# Patient Record
Sex: MALE | Race: WHITE | Employment: OTHER | URBAN - METROPOLITAN AREA
[De-identification: names, ages, dates, MRNs, and addresses within clinical notes are randomized per-mention and may not be internally consistent; named-entity substitution may affect disease eponyms.]

---

## 2021-08-29 ENCOUNTER — HOSPITAL ENCOUNTER (EMERGENCY)
Age: 61
Discharge: LEFT BEFORE DISCHARGE | End: 2021-08-29
Attending: FAMILY MEDICINE

## 2021-09-08 ENCOUNTER — HOSPITAL ENCOUNTER (INPATIENT)
Age: 61
LOS: 1 days | Discharge: HOME | DRG: 192 | End: 2021-09-11
Attending: EMERGENCY MEDICINE | Admitting: INTERNAL MEDICINE
Payer: COMMERCIAL

## 2021-09-08 ENCOUNTER — APPOINTMENT (OUTPATIENT)
Dept: GENERAL RADIOLOGY | Age: 61
DRG: 192 | End: 2021-09-08
Payer: COMMERCIAL

## 2021-09-08 DIAGNOSIS — R07.9 CHEST PAIN, UNSPECIFIED TYPE: Primary | ICD-10-CM

## 2021-09-08 PROBLEM — I49.3 PVC (PREMATURE VENTRICULAR CONTRACTION): Status: ACTIVE | Noted: 2021-09-08

## 2021-09-08 PROBLEM — I49.3 FREQUENT PVCS: Status: ACTIVE | Noted: 2021-09-08

## 2021-09-08 LAB
ALBUMIN SERPL-MCNC: 3.9 G/DL (ref 3.5–5.2)
ALP BLD-CCNC: 82 U/L (ref 40–129)
ALT SERPL-CCNC: 18 U/L (ref 0–40)
ANION GAP SERPL CALCULATED.3IONS-SCNC: 9 MMOL/L (ref 7–16)
AST SERPL-CCNC: 17 U/L (ref 0–39)
BILIRUB SERPL-MCNC: 0.3 MG/DL (ref 0–1.2)
BUN BLDV-MCNC: 21 MG/DL (ref 6–23)
CALCIUM SERPL-MCNC: 9.2 MG/DL (ref 8.6–10.2)
CHLORIDE BLD-SCNC: 105 MMOL/L (ref 98–107)
CO2: 25 MMOL/L (ref 22–29)
CREAT SERPL-MCNC: 1 MG/DL (ref 0.7–1.2)
GFR AFRICAN AMERICAN: >60
GFR NON-AFRICAN AMERICAN: >60 ML/MIN/1.73
GLUCOSE BLD-MCNC: 108 MG/DL (ref 74–99)
HCT VFR BLD CALC: 44.5 % (ref 37–54)
HEMOGLOBIN: 15 G/DL (ref 12.5–16.5)
MAGNESIUM: 2.2 MG/DL (ref 1.6–2.6)
MCH RBC QN AUTO: 31.4 PG (ref 26–35)
MCHC RBC AUTO-ENTMCNC: 33.7 % (ref 32–34.5)
MCV RBC AUTO: 93.1 FL (ref 80–99.9)
PDW BLD-RTO: 14.6 FL (ref 11.5–15)
PLATELET # BLD: 295 E9/L (ref 130–450)
PMV BLD AUTO: 9.5 FL (ref 7–12)
POTASSIUM SERPL-SCNC: 4.8 MMOL/L (ref 3.5–5)
PRO-BNP: 170 PG/ML (ref 0–125)
RBC # BLD: 4.78 E12/L (ref 3.8–5.8)
SARS-COV-2, NAAT: NOT DETECTED
SODIUM BLD-SCNC: 139 MMOL/L (ref 132–146)
TOTAL PROTEIN: 6.9 G/DL (ref 6.4–8.3)
TROPONIN, HIGH SENSITIVITY: 7 NG/L (ref 0–11)
WBC # BLD: 13.6 E9/L (ref 4.5–11.5)

## 2021-09-08 PROCEDURE — 93005 ELECTROCARDIOGRAM TRACING: CPT | Performed by: PHYSICIAN ASSISTANT

## 2021-09-08 PROCEDURE — 99283 EMERGENCY DEPT VISIT LOW MDM: CPT

## 2021-09-08 PROCEDURE — 83880 ASSAY OF NATRIURETIC PEPTIDE: CPT

## 2021-09-08 PROCEDURE — 84484 ASSAY OF TROPONIN QUANT: CPT

## 2021-09-08 PROCEDURE — G0378 HOSPITAL OBSERVATION PER HR: HCPCS

## 2021-09-08 PROCEDURE — 80053 COMPREHEN METABOLIC PANEL: CPT

## 2021-09-08 PROCEDURE — 83735 ASSAY OF MAGNESIUM: CPT

## 2021-09-08 PROCEDURE — 71045 X-RAY EXAM CHEST 1 VIEW: CPT

## 2021-09-08 PROCEDURE — 85027 COMPLETE CBC AUTOMATED: CPT

## 2021-09-08 PROCEDURE — 93005 ELECTROCARDIOGRAM TRACING: CPT | Performed by: NURSE PRACTITIONER

## 2021-09-08 PROCEDURE — 87635 SARS-COV-2 COVID-19 AMP PRB: CPT

## 2021-09-08 NOTE — ED TRIAGE NOTES
FIRST PROVIDER CONTACT ASSESSMENT NOTE                                                                                                Department of Emergency Medicine                                                      First Provider Note  21  4:30 PM EDT  NAME: Chau Bob  : 1960  MRN: [id number]    Chief Complaint: Chest Pain (was seen at doctor for chest pain x1 week with shortness of breath. sent in for abnormal ekg)      History of Present Illness:   Chau Bob is a 63 y.o. male who presents to the ED for CHEST PAIN AND SOB    Focused Physical Exam:  VS:    ED Triage Vitals [21 1619]   BP Temp Temp src Pulse Resp SpO2 Height Weight   (!) 158/81 98.6 °F (37 °C) -- 58 17 97 % 6' (1.829 m) 193 lb (87.5 kg)        General: Alert and in no apparent distress. Medical History:  has no past medical history on file. Surgical History:  has no past surgical history on file. Social History:  reports that he has been smoking cigarettes. He has been smoking about 1.50 packs per day. He has never used smokeless tobacco. He reports current alcohol use. He reports that he does not use drugs. Family History: family history is not on file. Allergies: Patient has no known allergies.      Initial Plan of Care:  Initiate Treatment-Testing, Proceed toTreatment Area When Bed Available for ED Attending/MLP to Continue Care    -------------------------------------------------END OF FIRST PROVIDER CONTACT ASSESSMENT NOTE--------------------------------------------------------  Electronically signed by KAPIL Mckeon CNP   DD: 21

## 2021-09-09 ENCOUNTER — APPOINTMENT (OUTPATIENT)
Dept: NON INVASIVE DIAGNOSTICS | Age: 61
DRG: 192 | End: 2021-09-09
Payer: COMMERCIAL

## 2021-09-09 ENCOUNTER — APPOINTMENT (OUTPATIENT)
Dept: NUCLEAR MEDICINE | Age: 61
DRG: 192 | End: 2021-09-09
Payer: COMMERCIAL

## 2021-09-09 ENCOUNTER — APPOINTMENT (OUTPATIENT)
Dept: CT IMAGING | Age: 61
DRG: 192 | End: 2021-09-09
Payer: COMMERCIAL

## 2021-09-09 DIAGNOSIS — I49.3 FREQUENT PVCS: Primary | ICD-10-CM

## 2021-09-09 DIAGNOSIS — R42 LIGHTHEADEDNESS: ICD-10-CM

## 2021-09-09 DIAGNOSIS — R42 DIZZINESS: ICD-10-CM

## 2021-09-09 DIAGNOSIS — R06.02 SOB (SHORTNESS OF BREATH): ICD-10-CM

## 2021-09-09 LAB
ADENOVIRUS BY PCR: NOT DETECTED
ALBUMIN SERPL-MCNC: 3.8 G/DL (ref 3.5–5.2)
ALP BLD-CCNC: 75 U/L (ref 40–129)
ALT SERPL-CCNC: 15 U/L (ref 0–40)
AMPHETAMINE SCREEN, URINE: NOT DETECTED
ANION GAP SERPL CALCULATED.3IONS-SCNC: 12 MMOL/L (ref 7–16)
APTT: 23.3 SEC (ref 24.5–35.1)
AST SERPL-CCNC: 13 U/L (ref 0–39)
BARBITURATE SCREEN URINE: NOT DETECTED
BASOPHILS ABSOLUTE: 0.05 E9/L (ref 0–0.2)
BASOPHILS RELATIVE PERCENT: 0.4 % (ref 0–2)
BENZODIAZEPINE SCREEN, URINE: NOT DETECTED
BILIRUB SERPL-MCNC: 0.5 MG/DL (ref 0–1.2)
BORDETELLA PARAPERTUSSIS BY PCR: NOT DETECTED
BORDETELLA PERTUSSIS BY PCR: NOT DETECTED
BUN BLDV-MCNC: 21 MG/DL (ref 6–23)
CALCIUM SERPL-MCNC: 9 MG/DL (ref 8.6–10.2)
CANNABINOID SCREEN URINE: POSITIVE
CHLAMYDOPHILIA PNEUMONIAE BY PCR: NOT DETECTED
CHLORIDE BLD-SCNC: 104 MMOL/L (ref 98–107)
CHOLESTEROL, TOTAL: 184 MG/DL (ref 0–199)
CO2: 23 MMOL/L (ref 22–29)
COCAINE METABOLITE SCREEN URINE: NOT DETECTED
CORONAVIRUS 229E BY PCR: NOT DETECTED
CORONAVIRUS HKU1 BY PCR: NOT DETECTED
CORONAVIRUS NL63 BY PCR: NOT DETECTED
CORONAVIRUS OC43 BY PCR: NOT DETECTED
CREAT SERPL-MCNC: 0.8 MG/DL (ref 0.7–1.2)
EKG ATRIAL RATE: 104 BPM
EKG ATRIAL RATE: 110 BPM
EKG P AXIS: 62 DEGREES
EKG P AXIS: 76 DEGREES
EKG P-R INTERVAL: 128 MS
EKG P-R INTERVAL: 132 MS
EKG Q-T INTERVAL: 352 MS
EKG Q-T INTERVAL: 354 MS
EKG QRS DURATION: 80 MS
EKG QRS DURATION: 90 MS
EKG QTC CALCULATION (BAZETT): 465 MS
EKG QTC CALCULATION (BAZETT): 476 MS
EKG R AXIS: 43 DEGREES
EKG R AXIS: 61 DEGREES
EKG T AXIS: 71 DEGREES
EKG T AXIS: 76 DEGREES
EKG VENTRICULAR RATE: 104 BPM
EKG VENTRICULAR RATE: 110 BPM
EOSINOPHILS ABSOLUTE: 0.19 E9/L (ref 0.05–0.5)
EOSINOPHILS RELATIVE PERCENT: 1.6 % (ref 0–6)
FENTANYL SCREEN, URINE: NOT DETECTED
GFR AFRICAN AMERICAN: >60
GFR NON-AFRICAN AMERICAN: >60 ML/MIN/1.73
GLUCOSE BLD-MCNC: 89 MG/DL (ref 74–99)
HBA1C MFR BLD: 6 % (ref 4–5.6)
HCT VFR BLD CALC: 42.4 % (ref 37–54)
HDLC SERPL-MCNC: 63 MG/DL
HEMOGLOBIN: 14.4 G/DL (ref 12.5–16.5)
HUMAN METAPNEUMOVIRUS BY PCR: NOT DETECTED
HUMAN RHINOVIRUS/ENTEROVIRUS BY PCR: NOT DETECTED
IMMATURE GRANULOCYTES #: 0.11 E9/L
IMMATURE GRANULOCYTES %: 0.9 % (ref 0–5)
INFLUENZA A BY PCR: NOT DETECTED
INFLUENZA B BY PCR: NOT DETECTED
INR BLD: 1
LACTATE DEHYDROGENASE: 167 U/L (ref 135–225)
LDL CHOLESTEROL CALCULATED: 87 MG/DL (ref 0–99)
LV EF: 58 %
LVEF MODALITY: NORMAL
LYMPHOCYTES ABSOLUTE: 3.11 E9/L (ref 1.5–4)
LYMPHOCYTES RELATIVE PERCENT: 26 % (ref 20–42)
Lab: ABNORMAL
MCH RBC QN AUTO: 31.6 PG (ref 26–35)
MCHC RBC AUTO-ENTMCNC: 34 % (ref 32–34.5)
MCV RBC AUTO: 93 FL (ref 80–99.9)
METHADONE SCREEN, URINE: NOT DETECTED
MONOCYTES ABSOLUTE: 1.03 E9/L (ref 0.1–0.95)
MONOCYTES RELATIVE PERCENT: 8.6 % (ref 2–12)
MYCOPLASMA PNEUMONIAE BY PCR: NOT DETECTED
NEUTROPHILS ABSOLUTE: 7.45 E9/L (ref 1.8–7.3)
NEUTROPHILS RELATIVE PERCENT: 62.5 % (ref 43–80)
OPIATE SCREEN URINE: NOT DETECTED
OXYCODONE URINE: NOT DETECTED
PARAINFLUENZA VIRUS 1 BY PCR: NOT DETECTED
PARAINFLUENZA VIRUS 2 BY PCR: NOT DETECTED
PARAINFLUENZA VIRUS 3 BY PCR: NOT DETECTED
PARAINFLUENZA VIRUS 4 BY PCR: NOT DETECTED
PDW BLD-RTO: 14.8 FL (ref 11.5–15)
PHENCYCLIDINE SCREEN URINE: NOT DETECTED
PLATELET # BLD: 263 E9/L (ref 130–450)
PMV BLD AUTO: 9.7 FL (ref 7–12)
POTASSIUM REFLEX MAGNESIUM: 4.4 MMOL/L (ref 3.5–5)
PROCALCITONIN: <0.02 NG/ML (ref 0–0.08)
PROSTATE SPECIFIC ANTIGEN: 2.05 NG/ML (ref 0–4)
PROTHROMBIN TIME: 10.8 SEC (ref 9.3–12.4)
RBC # BLD: 4.56 E12/L (ref 3.8–5.8)
RESPIRATORY SYNCYTIAL VIRUS BY PCR: NOT DETECTED
SARS-COV-2, PCR: NOT DETECTED
SODIUM BLD-SCNC: 139 MMOL/L (ref 132–146)
T4 FREE: 1.23 NG/DL (ref 0.93–1.7)
TOTAL PROTEIN: 6.5 G/DL (ref 6.4–8.3)
TRIGL SERPL-MCNC: 171 MG/DL (ref 0–149)
TROPONIN, HIGH SENSITIVITY: 9 NG/L (ref 0–11)
TSH SERPL DL<=0.05 MIU/L-ACNC: 2.91 UIU/ML (ref 0.27–4.2)
VLDLC SERPL CALC-MCNC: 34 MG/DL
WBC # BLD: 11.9 E9/L (ref 4.5–11.5)

## 2021-09-09 PROCEDURE — 0202U NFCT DS 22 TRGT SARS-COV-2: CPT

## 2021-09-09 PROCEDURE — 6370000000 HC RX 637 (ALT 250 FOR IP): Performed by: PHYSICIAN ASSISTANT

## 2021-09-09 PROCEDURE — 80053 COMPREHEN METABOLIC PANEL: CPT

## 2021-09-09 PROCEDURE — 2580000003 HC RX 258: Performed by: PHYSICIAN ASSISTANT

## 2021-09-09 PROCEDURE — APPSS180 APP SPLIT SHARED TIME > 60 MINUTES: Performed by: NURSE PRACTITIONER

## 2021-09-09 PROCEDURE — G0378 HOSPITAL OBSERVATION PER HR: HCPCS

## 2021-09-09 PROCEDURE — 85025 COMPLETE CBC W/AUTO DIFF WBC: CPT

## 2021-09-09 PROCEDURE — 93018 CV STRESS TEST I&R ONLY: CPT | Performed by: INTERNAL MEDICINE

## 2021-09-09 PROCEDURE — 80307 DRUG TEST PRSMV CHEM ANLYZR: CPT

## 2021-09-09 PROCEDURE — 84145 PROCALCITONIN (PCT): CPT

## 2021-09-09 PROCEDURE — 84443 ASSAY THYROID STIM HORMONE: CPT

## 2021-09-09 PROCEDURE — 6360000004 HC RX CONTRAST MEDICATION: Performed by: RADIOLOGY

## 2021-09-09 PROCEDURE — 78452 HT MUSCLE IMAGE SPECT MULT: CPT

## 2021-09-09 PROCEDURE — 3430000000 HC RX DIAGNOSTIC RADIOPHARMACEUTICAL: Performed by: RADIOLOGY

## 2021-09-09 PROCEDURE — 85610 PROTHROMBIN TIME: CPT

## 2021-09-09 PROCEDURE — 83615 LACTATE (LD) (LDH) ENZYME: CPT

## 2021-09-09 PROCEDURE — 84153 ASSAY OF PSA TOTAL: CPT

## 2021-09-09 PROCEDURE — 84484 ASSAY OF TROPONIN QUANT: CPT

## 2021-09-09 PROCEDURE — 84439 ASSAY OF FREE THYROXINE: CPT

## 2021-09-09 PROCEDURE — 80061 LIPID PANEL: CPT

## 2021-09-09 PROCEDURE — 78452 HT MUSCLE IMAGE SPECT MULT: CPT | Performed by: INTERNAL MEDICINE

## 2021-09-09 PROCEDURE — 83036 HEMOGLOBIN GLYCOSYLATED A1C: CPT

## 2021-09-09 PROCEDURE — 36415 COLL VENOUS BLD VENIPUNCTURE: CPT

## 2021-09-09 PROCEDURE — 71260 CT THORAX DX C+: CPT

## 2021-09-09 PROCEDURE — 93016 CV STRESS TEST SUPVJ ONLY: CPT | Performed by: INTERNAL MEDICINE

## 2021-09-09 PROCEDURE — 93017 CV STRESS TEST TRACING ONLY: CPT

## 2021-09-09 PROCEDURE — A9500 TC99M SESTAMIBI: HCPCS | Performed by: RADIOLOGY

## 2021-09-09 PROCEDURE — 85730 THROMBOPLASTIN TIME PARTIAL: CPT

## 2021-09-09 PROCEDURE — 93010 ELECTROCARDIOGRAM REPORT: CPT | Performed by: INTERNAL MEDICINE

## 2021-09-09 RX ORDER — ONDANSETRON 4 MG/1
4 TABLET, ORALLY DISINTEGRATING ORAL EVERY 8 HOURS PRN
Status: DISCONTINUED | OUTPATIENT
Start: 2021-09-09 | End: 2021-09-11 | Stop reason: HOSPADM

## 2021-09-09 RX ORDER — CITALOPRAM 40 MG/1
1 TABLET ORAL DAILY
COMMUNITY

## 2021-09-09 RX ORDER — SODIUM CHLORIDE 0.9 % (FLUSH) 0.9 %
5-40 SYRINGE (ML) INJECTION PRN
Status: DISCONTINUED | OUTPATIENT
Start: 2021-09-09 | End: 2021-09-11 | Stop reason: HOSPADM

## 2021-09-09 RX ORDER — SODIUM CHLORIDE 0.9 % (FLUSH) 0.9 %
5-40 SYRINGE (ML) INJECTION EVERY 12 HOURS SCHEDULED
Status: DISCONTINUED | OUTPATIENT
Start: 2021-09-09 | End: 2021-09-11 | Stop reason: HOSPADM

## 2021-09-09 RX ORDER — CYCLOBENZAPRINE HCL 10 MG
1 TABLET ORAL PRN
Status: ON HOLD | COMMUNITY
End: 2021-09-11 | Stop reason: HOSPADM

## 2021-09-09 RX ORDER — ACETAMINOPHEN 650 MG/1
650 SUPPOSITORY RECTAL EVERY 6 HOURS PRN
Status: DISCONTINUED | OUTPATIENT
Start: 2021-09-09 | End: 2021-09-11 | Stop reason: HOSPADM

## 2021-09-09 RX ORDER — ACETAMINOPHEN 325 MG/1
650 TABLET ORAL EVERY 6 HOURS PRN
Status: DISCONTINUED | OUTPATIENT
Start: 2021-09-09 | End: 2021-09-11 | Stop reason: HOSPADM

## 2021-09-09 RX ORDER — SODIUM CHLORIDE 9 MG/ML
25 INJECTION, SOLUTION INTRAVENOUS PRN
Status: DISCONTINUED | OUTPATIENT
Start: 2021-09-09 | End: 2021-09-11 | Stop reason: HOSPADM

## 2021-09-09 RX ORDER — PREDNISONE 20 MG/1
20 TABLET ORAL DAILY
Status: ON HOLD | COMMUNITY
Start: 2021-08-30 | End: 2021-09-11 | Stop reason: SDUPTHER

## 2021-09-09 RX ORDER — MAGNESIUM SULFATE IN WATER 40 MG/ML
2000 INJECTION, SOLUTION INTRAVENOUS PRN
Status: DISCONTINUED | OUTPATIENT
Start: 2021-09-09 | End: 2021-09-11 | Stop reason: HOSPADM

## 2021-09-09 RX ORDER — NICOTINE 21 MG/24HR
1 PATCH, TRANSDERMAL 24 HOURS TRANSDERMAL DAILY
Status: DISCONTINUED | OUTPATIENT
Start: 2021-09-09 | End: 2021-09-11 | Stop reason: HOSPADM

## 2021-09-09 RX ORDER — ONDANSETRON 2 MG/ML
4 INJECTION INTRAMUSCULAR; INTRAVENOUS EVERY 6 HOURS PRN
Status: DISCONTINUED | OUTPATIENT
Start: 2021-09-09 | End: 2021-09-11 | Stop reason: HOSPADM

## 2021-09-09 RX ORDER — POLYETHYLENE GLYCOL 3350 17 G/17G
17 POWDER, FOR SOLUTION ORAL DAILY PRN
Status: DISCONTINUED | OUTPATIENT
Start: 2021-09-09 | End: 2021-09-11 | Stop reason: HOSPADM

## 2021-09-09 RX ORDER — POTASSIUM CHLORIDE 7.45 MG/ML
10 INJECTION INTRAVENOUS PRN
Status: DISCONTINUED | OUTPATIENT
Start: 2021-09-09 | End: 2021-09-11 | Stop reason: HOSPADM

## 2021-09-09 RX ORDER — FAMOTIDINE 20 MG/1
20 TABLET, FILM COATED ORAL DAILY
Status: DISCONTINUED | OUTPATIENT
Start: 2021-09-09 | End: 2021-09-11 | Stop reason: HOSPADM

## 2021-09-09 RX ADMIN — ACETAMINOPHEN 650 MG: 325 TABLET ORAL at 02:32

## 2021-09-09 RX ADMIN — IOPAMIDOL 90 ML: 755 INJECTION, SOLUTION INTRAVENOUS at 11:55

## 2021-09-09 RX ADMIN — SODIUM CHLORIDE, PRESERVATIVE FREE 10 ML: 5 INJECTION INTRAVENOUS at 10:26

## 2021-09-09 RX ADMIN — Medication 35 MILLICURIE: at 13:32

## 2021-09-09 RX ADMIN — FAMOTIDINE 20 MG: 20 TABLET, FILM COATED ORAL at 10:21

## 2021-09-09 RX ADMIN — ACETAMINOPHEN 650 MG: 325 TABLET ORAL at 20:06

## 2021-09-09 RX ADMIN — Medication 11.4 MILLICURIE: at 12:13

## 2021-09-09 RX ADMIN — SODIUM CHLORIDE, PRESERVATIVE FREE 10 ML: 5 INJECTION INTRAVENOUS at 20:06

## 2021-09-09 ASSESSMENT — PAIN DESCRIPTION - FREQUENCY: FREQUENCY: INTERMITTENT

## 2021-09-09 ASSESSMENT — PAIN SCALES - GENERAL
PAINLEVEL_OUTOF10: 0
PAINLEVEL_OUTOF10: 7
PAINLEVEL_OUTOF10: 0
PAINLEVEL_OUTOF10: 7
PAINLEVEL_OUTOF10: 7

## 2021-09-09 ASSESSMENT — PAIN DESCRIPTION - LOCATION: LOCATION: HEAD

## 2021-09-09 ASSESSMENT — PAIN - FUNCTIONAL ASSESSMENT: PAIN_FUNCTIONAL_ASSESSMENT: ACTIVITIES ARE NOT PREVENTED

## 2021-09-09 ASSESSMENT — PAIN DESCRIPTION - PAIN TYPE: TYPE: ACUTE PAIN

## 2021-09-09 ASSESSMENT — PAIN DESCRIPTION - ONSET: ONSET: ON-GOING

## 2021-09-09 ASSESSMENT — PAIN DESCRIPTION - PROGRESSION: CLINICAL_PROGRESSION: NOT CHANGED

## 2021-09-09 ASSESSMENT — PAIN DESCRIPTION - ORIENTATION: ORIENTATION: MID

## 2021-09-09 ASSESSMENT — PAIN DESCRIPTION - DESCRIPTORS: DESCRIPTORS: ACHING;HEADACHE;DISCOMFORT

## 2021-09-09 NOTE — PROGRESS NOTES
OT SESSION ATTEMPT     Date:2021  Patient Name: Gloria Aiken  MRN: [id number]  : 1960  Room: [address] [place] [address]     Attempted OT session this date:    Per RN pt is off unit for Stress Test at this time.   Will reattempt OT eval at a later time/date    Mrak Huerta, [address] Margie Patel OTR/L #[id number]

## 2021-09-09 NOTE — ED NOTES
JABIER faxed, [phone] notified. 8400 notified that all items marked completed at [facility] were NOT completed and accidentally marked as completed.       Belen Sellers RN  09/09/21 4995

## 2021-09-09 NOTE — H&P
[address] [facility]  History and Physical      CHIEF COMPLAINT:    Chief Complaint   Patient presents with    Chest Pain     was seen at doctor for chest pain x1 week with shortness of breath. sent in for abnormal ekg        Patient of True Monalisafalguni  presents with:  Frequent PVCs    History of Present Illness:   Patient states that for the last 2 or 3 weeks he has had a cough, mild shortness of breath with ambulation, bilateral sinus pressure, and a generalized headache. No sick contacts. He smokes 1.5 packs/day and drinks a sixpack of alcohol most days of the week. He also feels intermittent night sweats though has not had any unintentional weight loss. He denies chest pain, though apparently he reported to the ED at some point, per ED triage note. REVIEW OF SYSTEMS:  Pertinent negatives are above in HPI. 10 point ROS otherwise negative. History reviewed. No pertinent past medical history. History reviewed. No pertinent surgical history. Medications Prior to Admission:    Not in a hospital admission. Note that the patient's home medications were reviewed and the above list is accurate to the best of my knowledge at the time of the exam.    Allergies:    Patient has no known allergies. Social History:    reports that he has been smoking cigarettes. He has been smoking about 1.50 packs per day. He has never used smokeless tobacco. He reports current alcohol use. He reports that he does not use drugs.     Family History:   Dad prostate ca      PHYSICAL EXAM:    Vitals:  /70   Pulse 99   Temp 98.7 °F (37.1 °C)   Resp 16   Ht 6' (1.829 m)   Wt 193 lb (87.5 kg)   SpO2 94%   BMI 26.18 kg/m²       General appearance: NAD, conversant, appears quite a lot older than stated age  Eyes: Sclerae anicteric, PERRLA  HEENT: AT/NC, MMM, no sinus tenderness  Neck: FROM, supple, no thyromegaly  Lymph: No cervical / supraclavicular lymphadenopathy  Lungs: Clear to auscultation, WOB normal.  97%/RA both at rest and ambulation  CV: RRR, no MRGs, no lower extremity edema  Abdomen: Soft, non-tender; no masses or HSM, +BS  Extremities: FROM without synovitis. No clubbing or cyanosis of the hands. Skin: no rash, induration, lesions, or ulcers  Psych: Calm and cooperative. Normal judgement and insight. Normal mood and affect. Neuro: Alert and interactive, face symmetric, speech fluent. LABS:  All labs reviewed. Of note:  CBC with Differential:    Lab Results   Component Value Date    WBC 13.6 09/08/2021    RBC 4.78 09/08/2021    HGB 15.0 09/08/2021    HCT 44.5 09/08/2021     09/08/2021    MCV 93.1 09/08/2021    MCH 31.4 09/08/2021    MCHC 33.7 09/08/2021    RDW 14.6 09/08/2021     CMP:    Lab Results   Component Value Date     09/08/2021    K 4.8 09/08/2021     09/08/2021    CO2 25 09/08/2021    BUN 21 09/08/2021    CREATININE 1.0 09/08/2021    GFRAA >60 09/08/2021    LABGLOM >60 09/08/2021    GLUCOSE 108 09/08/2021    PROT 6.9 09/08/2021    LABALBU 3.9 09/08/2021    CALCIUM 9.2 09/08/2021    BILITOT 0.3 09/08/2021    ALKPHOS 82 09/08/2021    AST 17 09/08/2021    ALT 18 09/08/2021       Imaging:  I've personally reviewed the patient's CXR: clear    EKG:  I've personally reviewed the patient's EKG:  NSR frequent PVCs no acute ischemic changes    Telemetry:  I've personally reviewed the patient's telemetry:  NSR, PVCs     ASSESSMENT/PLAN:  Principal Problem:    Frequent PVCs  Active Problems:    PVC (premature ventricular contraction)  Resolved Problems:    * No resolved hospital problems. *      Frequent PVCs really not an indication for admission    Cards already consulted    I will order echo but due to understaffing it likely will not get done    Labs fine     Sounds like he has a viral illness.     Cough + night sweats + long-time smoker = obtain CT chest for lung cancer    Check viral panel    Code status: Full  Requires obs level of care  Ahsan OCHOA Gadiel Camacho MD    6:47 AM  9/9/2021

## 2021-09-09 NOTE — CONSULTS
Inpatient Cardiology Consultation      Reason for Consult:  PVC's. Pts PCP wanted patient admitted for cardio work up for Telnexus    Consulting Physician: Dr José Crenshaw    Requesting Physician:  Lenore LUNDBERG    Date of Consultation: 9/9/2021    HISTORY OF PRESENT ILLNESS: 62 yo not previously known to any cardiologist.     PMH: Tobacco abuse, anxiety/depression, ETOH abuse. For around 3 weeks has been feeling fatigued, experiencing ELAM, night sweats, some lightheadedness along with feeling like his head is congested with HA's  but denies CP or palpitations. [facility] 9/8/2021 BP upon arrival 158/81 HR 50's and SB. Sent in from PCP's office for abnormal EKG (PVC's) and 1 week SOB. Na139, K+ 4.8--> 4.4, Bun/Cr 21/1.0-->21/0.8, p-, troponin 7-->9, LFT's WNL, WBC 13.6-->11.9, Hgb 15.0, INR 1.0, COVID-19 NEGATIVE. CXR no CHF or pneumonia. No medications given in ED  Current /70 HR 90's. CT Chest W pending    Please note: past medical records were reviewed per electronic medical record (EMR) - see detailed reports under Past Medical/ Surgical History. Past Medical History:    1. Tobacco abuse  2. ETOH abuse 6 pack a day  3. Anxiety/Depression  4. Received J&J Vaccine    Past Surgical History:   Tonsillectomy    Medications Prior to admit:  Celexa    Current Medications:    Current Facility-Administered Medications: sodium chloride flush 0.9 % injection 5-40 mL, 5-40 mL, IntraVENous, 2 times per day  sodium chloride flush 0.9 % injection 5-40 mL, 5-40 mL, IntraVENous, PRN  0.9 % sodium chloride infusion, 25 mL, IntraVENous, PRN  enoxaparin (LOVENOX) injection 40 mg, 40 mg, SubCUTAneous, Daily  ondansetron (ZOFRAN-ODT) disintegrating tablet 4 mg, 4 mg, Oral, Q8H PRN **OR** ondansetron (ZOFRAN) injection 4 mg, 4 mg, IntraVENous, Q6H PRN  polyethylene glycol (GLYCOLAX) packet 17 g, 17 g, Oral, Daily PRN  acetaminophen (TYLENOL) tablet 650 mg, 650 mg, Oral, Q6H PRN **OR** acetaminophen (TYLENOL) suppository 650 mg, 650 mg, Rectal, Q6H PRN  potassium chloride 10 mEq/100 mL IVPB (Peripheral Line), 10 mEq, IntraVENous, PRN  magnesium sulfate 2000 mg in 50 mL IVPB premix, 2,000 mg, IntraVENous, PRN  famotidine (PEPCID) tablet 20 mg, 20 mg, Oral, Daily  nicotine (NICODERM CQ) 14 MG/24HR 1 patch, 1 patch, TransDERmal, Daily  perflutren lipid microspheres (DEFINITY) injection 1.65 mg, 1.5 mL, IntraVENous, ONCE PRN    Allergies:  NKDA per patient report    Social History:    Tobacco: 60 pack years  ETOH: 6 pack a day  Illicit Drugs: Denies  Caffeine: Coffee  Activity: Lives in split level home with wife and two step children ages 25 and 25. Drives, self-employed . No assistive devices  Code Status: Full Code      Family History: Mother  from carcinoma. No CAD/DM/PPM/ICD reported  Father  from \"MI\" his first. + tobacco and DM. No CAD/PCI/CABG/PPM/CVA/ICD. REVIEW OF SYSTEMS:     · Constitutional: + fatigue. Denies fevers, chills. + night sweats  · Eyes: Denies visual changes or drainage  · ENT: Denies headaches or hearing loss. No mouth sores or sore throat. No epistaxis   · Cardiovascular: Denies chest pain, pressure or palpitations. No lower extremity swelling. · Respiratory: + ELAM. Denies cough, orthopnea or PND. No hemoptysis   · Gastrointestinal: Denies hematemesis or anorexia. No hematochezia or melena    · Genitourinary: Denies urgency, dysuria or hematuria. · Musculoskeletal: Denies gait disturbance, weakness or joint complaints  · Integumentary: Denies rash, hives or pruritis   · Neurological: + HA's and head feels \"congested. \" + lightheaded. Denies seizures. No numbness or tingling  · Psychiatric: + anxiety/depression. · Endocrine: Denies temperature intolerance. No recent weight change. .  · Hematologic/Lymphatic: Denies abnormal bruising or bleeding.  No swollen lymph nodes    PHYSICAL EXAM:   /70   Pulse 99   Temp 98.7 °F (37.1 °C)   Resp 16 Ht 6' (1.829 m)   Wt 193 lb (87.5 kg)   SpO2 94%   BMI 26.18 kg/m²   CONST:  Well developed, thin  male who appears of stated age. Awake, alert and cooperative. No apparent distress. HEENT:   Head- Normocephalic, atraumatic   Eyes- Conjunctivae pink, anicteric  Throat- Oral mucosa pink and moist  Neck-  No stridor, trachea midline, no jugular venous distention. No carotid bruit. CHEST: Chest symmetrical and non-tender to palpation. No accessory muscle use or intercostal retractions  RESPIRATORY: Lung sounds - diminished in the bases, on RA   CARDIOVASCULAR:     Heart Ausculation- Regular rate and rhythm, no murmur. No s3, s4 or rub   PV: No lower extremity edema. No varicosities. Pedal pulses palpable, no clubbing or cyanosis   ABDOMEN: Soft, non-tender to light palpation. Bowel sounds present. No palpable masses; no abdominal bruit  MS: Good muscle strength and tone. No atrophy or abnormal movements. : Deferred  SKIN: Warm and dry no statis dermatitis or ulcers   NEURO / PSYCH: Oriented to person, place and time. Speech clear and appropriate. Follows all commands.  Pleasant affect     DATA:    ECG 9/8/2021 @ 1615 ST with frequent PVC's   Tele strips: SR with frequent PVC's/bigemeny 90's    Diagnostic:    See HPI    Labs:   CBC:   Recent Labs     09/08/21 1636 09/09/21  0630   WBC 13.6* 11.9*   HGB 15.0 14.4   HCT 44.5 42.4    263     BMP:   Recent Labs     09/08/21  1636 09/09/21  0630    139   K 4.8 4.4   CO2 25 23   BUN 21 21   CREATININE 1.0 0.8   LABGLOM >60 >60   CALCIUM 9.2 9.0     Mag:   Recent Labs     09/08/21  1636   MG 2.2     proBNP:   Recent Labs     09/08/21  1636   PROBNP 170*     PT/INR:   Recent Labs     09/09/21  0630   PROTIME 10.8   INR 1.0     APTT:  Recent Labs     09/09/21  0630   APTT 23.3*     CARDIAC ENZYMES:  Recent Labs     09/08/21  1636 09/09/21  0630   TROPHS 7 9     LIVER PROFILE:  Recent Labs     09/08/21  1636 09/09/21  0630   AST 17 13   ALT 18 15   LABALBU 3.9 3.8     ASSESSMENT:  1. Frequent PVC's  2. + ELAM  3. HA's and lightheadedness  4. + Night sweats  5. Tobacco abuse  6. ETOH abuse        PLAN:  1. Exercise MPS today  2. TTE will read when done  3. 30 day event monitor  to be placed on patient 9/10/2021  4. Smoking/ETOH cessation discusssed  5. Further recommendations to follow  6. Will need follow- up with Dr Aung Alex after discharge    Discussed with Dr Aung Alex  Electronically signed by Kiera Chang. NICHOLAS Ruiz on 9/9/2021 at 7:45 AM     Patient seen and examined case is discussed in detail with cardiology nurse practitioner and agree with assessment and plan and physical examination discussed with her in detail and documented above. Patient stress test is abnormal and he will need invasive coronary angiogram for further evaluation. N.p.o. after midnight. Myocardial perfusion stress test September 9, 2021:  Impression   1.  No stress-induced EKG changes at peak exercise. However, frequent   ventricular ectopy in the form of multiple ventricular   triplets/nonsustained VT noted. 2.  The myocardial perfusion imaging was abnormal.   3.  The abnormality was a medium-sized, mild perfusion defect   involving the mid to distal inferior septum and anterior septum   suggestive of prior infarct with minimal homa-infarct ischemia. 4.  Overall left ventricular systolic function was normal with   regional wall motion abnormalities. 5.  No transient ischemic dilatation. 6.  [place] treadmill score was 3.5 implying intermediate risk.     7.  Exercise capacity was below average.    8.  Intermediate risk general exercise treadmill test.       Thank you for sending your patient to this [facility].

## 2021-09-09 NOTE — ED NOTES
Bed: HB  Expected date:   Expected time:   Means of arrival:   Comments:  triage     Indra Palmer RN  09/08/21 2036

## 2021-09-09 NOTE — ED PROVIDER NOTES
Department of Emergency Medicine   ED  Provider Note  Admit Date/RoomTime: 9/8/2021  8:36 PM  ED Room: [facility]          History of Present Illness:  9/8/21, Time: 8:56 PM EDT  Chief Complaint   Patient presents with    Chest Pain     was seen at doctor for chest pain x1 week with shortness of breath. sent in for abnormal ekg                Vonda Bear is a 63 y.o. male presenting to the ED for shortness of breath. Patient has runny nose, cough, congestion for the past week. Also is admitting chest tightness for the past week. Came on gradually, nothing makes it better or worse, tightness does not radiate anywhere. Did get his Covid vaccine. Was seen by his PCPs office today, had EKG which showed bigeminy, he was sent in for evaluation. Has no known cardiac disease. Denies any nausea, vomiting, back pain, change in bowel or bladder, paresthesias,  back pain, or any other symptoms or complaints. Review of Systems:   Pertinent positives and negatives are stated within HPI, all other systems reviewed and are negative.        --------------------------------------------- PAST HISTORY ---------------------------------------------  Past Medical History:  has no past medical history on file. Past Surgical History:  has no past surgical history on file. Social History:  reports that he has been smoking cigarettes. He has been smoking about 1.50 packs per day. He has never used smokeless tobacco. He reports current alcohol use. He reports that he does not use drugs. Family History: family history is not on file. . Unless otherwise noted, family history is non contributory    The patients home medications have been reviewed. Allergies: Patient has no known allergies.         ---------------------------------------------------PHYSICAL EXAM--------------------------------------    Constitutional/General: Alert and oriented x3  Head: Normocephalic and atraumatic  Eyes: PERRL, EOMI, sclera non icteric  Mouth: Oropharynx clear, handling secretions, no trismus, no asymmetry of the posterior oropharynx or uvular edema  Neck: Supple, full ROM, no stridor, no meningeal signs  Respiratory: Lungs clear to auscultation bilaterally, no wheezes, rales, or rhonchi. Not in respiratory distress  Cardiovascular:  Regular rate. Regular rhythm. 2+ distal pulses. Equal extremity pulses. Chest: No chest wall tenderness  GI:  Abdomen Soft, Non tender, Non distended. No rebound, guarding, or rigidity. No pulsatile masses. Musculoskeletal: Moves all extremities x 4. Warm and well perfused, no clubbing, cyanosis, or edema. Capillary refill <3 seconds  Integument: skin warm and dry. No rashes. Neurologic: GCS 15, no focal deficits, symmetric strength 5/5 in the upper and lower extremities bilaterally  Psychiatric: Normal Affect          -------------------------------------------------- RESULTS -------------------------------------------------  I have personally reviewed all laboratory and imaging results for this patient. Results are listed below.      LABS: (Lab results interpreted by me)  Results for orders placed or performed during the hospital encounter of 09/08/21   COVID-19, Rapid    Specimen: Nasopharyngeal Swab   Result Value Ref Range    SARS-CoV-2, NAAT Not Detected Not Detected   CBC   Result Value Ref Range    WBC 13.6 (H) 4.5 - 11.5 E9/L    RBC 4.78 3.80 - 5.80 E12/L    Hemoglobin 15.0 12.5 - 16.5 g/dL    Hematocrit 44.5 37.0 - 54.0 %    MCV 93.1 80.0 - 99.9 fL    MCH 31.4 26.0 - 35.0 pg    MCHC 33.7 32.0 - 34.5 %    RDW 14.6 11.5 - 15.0 fL    Platelets 186 559 - 414 E9/L    MPV 9.5 7.0 - 12.0 fL   Comprehensive Metabolic Panel   Result Value Ref Range    Sodium 139 132 - 146 mmol/L    Potassium 4.8 3.5 - 5.0 mmol/L    Chloride 105 98 - 107 mmol/L    CO2 25 22 - 29 mmol/L    Anion Gap 9 7 - 16 mmol/L    Glucose 108 (H) 74 - 99 mg/dL    BUN 21 6 - 23 mg/dL    CREATININE 1.0 0.7 - 1.2 mg/dL    GFR Non- American >60 >=60 mL/min/1.73    GFR African American >60     Calcium 9.2 8.6 - 10.2 mg/dL    Total Protein 6.9 6.4 - 8.3 g/dL    Albumin 3.9 3.5 - 5.2 g/dL    Total Bilirubin 0.3 0.0 - 1.2 mg/dL    Alkaline Phosphatase 82 40 - 129 U/L    ALT 18 0 - 40 U/L    AST 17 0 - 39 U/L   Troponin   Result Value Ref Range    Troponin, High Sensitivity 7 0 - 11 ng/L   Brain Natriuretic Peptide   Result Value Ref Range    Pro- (H) 0 - 125 pg/mL   Magnesium   Result Value Ref Range    Magnesium 2.2 1.6 - 2.6 mg/dL   ,       RADIOLOGY:  Interpreted by Radiologist unless otherwise specified  XR CHEST PORTABLE    (Results Pending)         EKG Interpretation  Interpreted by emergency department physician, Dr. Teresa Winston     Sinus rhythm, rate 110, scattered PVCs, no STEMI        ------------------------- NURSING NOTES AND VITALS REVIEWED ---------------------------   The nursing notes within the ED encounter and vital signs as below have been reviewed by myself  BP (!) 158/81   Pulse 58   Temp 98.6 °F (37 °C)   Resp 17   Ht 6' (1.829 m)   Wt 193 lb (87.5 kg)   SpO2 97%   BMI 26.18 kg/m²     Oxygen Saturation Interpretation: Normal    The patients available past medical records and past encounters were reviewed. ------------------------------ ED COURSE/MEDICAL DECISION MAKING----------------------  Medications - No data to display        The cardiac monitor revealed sinus with a heart rate in the 90s as interpreted by me. The cardiac monitor was ordered secondary to the patient's CP and to monitor the patient for dysrhythmia. [facility] [id number]         Medical Decision Making:    Patient's outpatient EKGs did show bigeminy. He has PVCs on his rhythm strip. .  Labs and imaging reviewed. Reevaluation, patient's resting comfortably. States his PCP want admitted him admited for cardiac evaluation. Discussed with the hospitalist, patient will be admitted. Counseling:    The emergency provider has spoken with the patient and discussed todays results, in addition to providing specific details for the plan of care and counseling regarding the diagnosis and prognosis. Questions are answered at this time and they are agreeable with the plan.       --------------------------------- IMPRESSION AND DISPOSITION ---------------------------------    IMPRESSION  1. Chest pain, unspecified type        DISPOSITION  Disposition: Admit to telemetry  Patient condition is stable        NOTE: This report was transcribed using voice recognition software.  Every effort was made to ensure accuracy; however, inadvertent computerized transcription errors may be present        Marysol Choudhury MD  09/08/21 1188

## 2021-09-09 NOTE — PLAN OF CARE
Problem: Falls - Risk of:  Goal: Absence of physical injury  Description: Absence of physical injury  9/9/2021 1850 by Anabelle Dahl RN  Outcome: Met This Shift     Problem: Falls - Risk of:  Goal: Will remain free from falls  Description: Will remain free from falls  9/9/2021 1850 by Anabelle Dahl RN  Outcome: Met This Shift  9/9/2021 1849 by Anabelle Dahl RN  Outcome: Met This Shift

## 2021-09-09 NOTE — PROCEDURES
Exercise Nuclear Stress Test:    Cardiologist: Dr. Fiona Daley EKG: Normal sinus rhythm with frequent and consecutive premature ventricular complexes in the form of couplets. Indications for study:      Exercise stress test was performed using the Fernando Protocol   No chest pain   Exercise time: 3:31 min, METs: 5.7, MPHR: 86%, [place] treadmill score: 3.5   Ventricular ectopy progressed from frequent couplets at baseline to triplets during exercise and more ventricular complexes than sinus beats noted.  No EKG changes suggestive of stress induced ischemia   Below average functional capacity   There was an appropriate BP and heart response to exercise and recovery   Nuclear images pending    Randal Tena MD., [facility] [place].    [facility]) Cardiology

## 2021-09-10 PROBLEM — I50.22 CHRONIC SYSTOLIC (CONGESTIVE) HEART FAILURE (HCC): Status: ACTIVE | Noted: 2021-09-10

## 2021-09-10 PROBLEM — I20.0 UNSTABLE ANGINA (HCC): Status: ACTIVE | Noted: 2021-09-10

## 2021-09-10 LAB
ABO/RH: NORMAL
ALBUMIN SERPL-MCNC: 3.8 G/DL (ref 3.5–5.2)
ALP BLD-CCNC: 81 U/L (ref 40–129)
ALT SERPL-CCNC: 14 U/L (ref 0–40)
ANION GAP SERPL CALCULATED.3IONS-SCNC: 11 MMOL/L (ref 7–16)
ANTIBODY SCREEN: NORMAL
AST SERPL-CCNC: 13 U/L (ref 0–39)
BASOPHILS ABSOLUTE: 0.04 E9/L (ref 0–0.2)
BASOPHILS RELATIVE PERCENT: 0.4 % (ref 0–2)
BILIRUB SERPL-MCNC: 0.5 MG/DL (ref 0–1.2)
BUN BLDV-MCNC: 17 MG/DL (ref 6–23)
CALCIUM SERPL-MCNC: 9.5 MG/DL (ref 8.6–10.2)
CHLORIDE BLD-SCNC: 102 MMOL/L (ref 98–107)
CO2: 26 MMOL/L (ref 22–29)
CREAT SERPL-MCNC: 0.9 MG/DL (ref 0.7–1.2)
EOSINOPHILS ABSOLUTE: 0.17 E9/L (ref 0.05–0.5)
EOSINOPHILS RELATIVE PERCENT: 1.6 % (ref 0–6)
GFR AFRICAN AMERICAN: >60
GFR NON-AFRICAN AMERICAN: >60 ML/MIN/1.73
GLUCOSE BLD-MCNC: 101 MG/DL (ref 74–99)
HCT VFR BLD CALC: 46.5 % (ref 37–54)
HEMOGLOBIN: 15.4 G/DL (ref 12.5–16.5)
IMMATURE GRANULOCYTES #: 0.06 E9/L
IMMATURE GRANULOCYTES %: 0.6 % (ref 0–5)
LV EF: 50 %
LVEF MODALITY: NORMAL
LYMPHOCYTES ABSOLUTE: 2.76 E9/L (ref 1.5–4)
LYMPHOCYTES RELATIVE PERCENT: 25.8 % (ref 20–42)
MCH RBC QN AUTO: 31.1 PG (ref 26–35)
MCHC RBC AUTO-ENTMCNC: 33.1 % (ref 32–34.5)
MCV RBC AUTO: 93.9 FL (ref 80–99.9)
MONOCYTES ABSOLUTE: 1.03 E9/L (ref 0.1–0.95)
MONOCYTES RELATIVE PERCENT: 9.6 % (ref 2–12)
NEUTROPHILS ABSOLUTE: 6.63 E9/L (ref 1.8–7.3)
NEUTROPHILS RELATIVE PERCENT: 62 % (ref 43–80)
PDW BLD-RTO: 14.6 FL (ref 11.5–15)
PLATELET # BLD: 259 E9/L (ref 130–450)
PMV BLD AUTO: 9.8 FL (ref 7–12)
POTASSIUM REFLEX MAGNESIUM: 5 MMOL/L (ref 3.5–5)
RBC # BLD: 4.95 E12/L (ref 3.8–5.8)
SODIUM BLD-SCNC: 139 MMOL/L (ref 132–146)
TOTAL PROTEIN: 6.7 G/DL (ref 6.4–8.3)
WBC # BLD: 10.7 E9/L (ref 4.5–11.5)

## 2021-09-10 PROCEDURE — 6370000000 HC RX 637 (ALT 250 FOR IP): Performed by: PHYSICIAN ASSISTANT

## 2021-09-10 PROCEDURE — 86900 BLOOD TYPING SEROLOGIC ABO: CPT

## 2021-09-10 PROCEDURE — 36415 COLL VENOUS BLD VENIPUNCTURE: CPT

## 2021-09-10 PROCEDURE — 93306 TTE W/DOPPLER COMPLETE: CPT

## 2021-09-10 PROCEDURE — 2580000003 HC RX 258: Performed by: PHYSICIAN ASSISTANT

## 2021-09-10 PROCEDURE — APPSS30 APP SPLIT SHARED TIME 16-30 MINUTES: Performed by: NURSE PRACTITIONER

## 2021-09-10 PROCEDURE — 4A023N7 MEASUREMENT OF CARDIAC SAMPLING AND PRESSURE, LEFT HEART, PERCUTANEOUS APPROACH: ICD-10-PCS | Performed by: INTERNAL MEDICINE

## 2021-09-10 PROCEDURE — 93458 L HRT ARTERY/VENTRICLE ANGIO: CPT

## 2021-09-10 PROCEDURE — 86901 BLOOD TYPING SEROLOGIC RH(D): CPT

## 2021-09-10 PROCEDURE — 2709999900 HC NON-CHARGEABLE SUPPLY

## 2021-09-10 PROCEDURE — C1887 CATHETER, GUIDING: HCPCS

## 2021-09-10 PROCEDURE — 80053 COMPREHEN METABOLIC PANEL: CPT

## 2021-09-10 PROCEDURE — 6370000000 HC RX 637 (ALT 250 FOR IP): Performed by: INTERNAL MEDICINE

## 2021-09-10 PROCEDURE — C1894 INTRO/SHEATH, NON-LASER: HCPCS

## 2021-09-10 PROCEDURE — 6360000002 HC RX W HCPCS

## 2021-09-10 PROCEDURE — 2140000000 HC CCU INTERMEDIATE R&B

## 2021-09-10 PROCEDURE — B2111ZZ FLUOROSCOPY OF MULTIPLE CORONARY ARTERIES USING LOW OSMOLAR CONTRAST: ICD-10-PCS | Performed by: INTERNAL MEDICINE

## 2021-09-10 PROCEDURE — 99233 SBSQ HOSP IP/OBS HIGH 50: CPT | Performed by: INTERNAL MEDICINE

## 2021-09-10 PROCEDURE — 86850 RBC ANTIBODY SCREEN: CPT

## 2021-09-10 PROCEDURE — 2500000003 HC RX 250 WO HCPCS

## 2021-09-10 PROCEDURE — C1769 GUIDE WIRE: HCPCS

## 2021-09-10 PROCEDURE — 97165 OT EVAL LOW COMPLEX 30 MIN: CPT

## 2021-09-10 PROCEDURE — 93458 L HRT ARTERY/VENTRICLE ANGIO: CPT | Performed by: INTERNAL MEDICINE

## 2021-09-10 PROCEDURE — 85025 COMPLETE CBC W/AUTO DIFF WBC: CPT

## 2021-09-10 RX ORDER — SODIUM CHLORIDE 0.9 % (FLUSH) 0.9 %
5-40 SYRINGE (ML) INJECTION PRN
Status: DISCONTINUED | OUTPATIENT
Start: 2021-09-10 | End: 2021-09-11 | Stop reason: HOSPADM

## 2021-09-10 RX ORDER — METOPROLOL SUCCINATE 25 MG/1
25 TABLET, EXTENDED RELEASE ORAL DAILY
Status: DISCONTINUED | OUTPATIENT
Start: 2021-09-10 | End: 2021-09-11 | Stop reason: HOSPADM

## 2021-09-10 RX ORDER — SODIUM CHLORIDE 0.9 % (FLUSH) 0.9 %
5-40 SYRINGE (ML) INJECTION EVERY 12 HOURS SCHEDULED
Status: DISCONTINUED | OUTPATIENT
Start: 2021-09-10 | End: 2021-09-11 | Stop reason: HOSPADM

## 2021-09-10 RX ORDER — SODIUM CHLORIDE 9 MG/ML
25 INJECTION, SOLUTION INTRAVENOUS PRN
Status: DISCONTINUED | OUTPATIENT
Start: 2021-09-10 | End: 2021-09-11 | Stop reason: HOSPADM

## 2021-09-10 RX ORDER — ASPIRIN 81 MG/1
324 TABLET, CHEWABLE ORAL ONCE
Status: COMPLETED | OUTPATIENT
Start: 2021-09-10 | End: 2021-09-10

## 2021-09-10 RX ORDER — ASPIRIN 81 MG/1
81 TABLET, CHEWABLE ORAL DAILY
Status: DISCONTINUED | OUTPATIENT
Start: 2021-09-11 | End: 2021-09-11 | Stop reason: HOSPADM

## 2021-09-10 RX ORDER — ACETAMINOPHEN 325 MG/1
650 TABLET ORAL EVERY 4 HOURS PRN
Status: DISCONTINUED | OUTPATIENT
Start: 2021-09-10 | End: 2021-09-11 | Stop reason: HOSPADM

## 2021-09-10 RX ADMIN — FAMOTIDINE 20 MG: 20 TABLET, FILM COATED ORAL at 10:18

## 2021-09-10 RX ADMIN — ACETAMINOPHEN 650 MG: 325 TABLET ORAL at 20:20

## 2021-09-10 RX ADMIN — SODIUM CHLORIDE, PRESERVATIVE FREE 10 ML: 5 INJECTION INTRAVENOUS at 20:20

## 2021-09-10 RX ADMIN — METOPROLOL SUCCINATE 25 MG: 25 TABLET, EXTENDED RELEASE ORAL at 16:59

## 2021-09-10 RX ADMIN — ACETAMINOPHEN 650 MG: 325 TABLET ORAL at 10:23

## 2021-09-10 RX ADMIN — ASPIRIN 324 MG: 81 TABLET, CHEWABLE ORAL at 14:40

## 2021-09-10 RX ADMIN — SODIUM CHLORIDE, PRESERVATIVE FREE 10 ML: 5 INJECTION INTRAVENOUS at 10:20

## 2021-09-10 ASSESSMENT — PAIN SCALES - GENERAL
PAINLEVEL_OUTOF10: 0
PAINLEVEL_OUTOF10: 0
PAINLEVEL_OUTOF10: 2
PAINLEVEL_OUTOF10: 0
PAINLEVEL_OUTOF10: 0
PAINLEVEL_OUTOF10: 2
PAINLEVEL_OUTOF10: 0
PAINLEVEL_OUTOF10: 0

## 2021-09-10 NOTE — PROGRESS NOTES
[address] [place] [address] [address]        Date:9/10/2021                                                  Patient Name: Armani Muñoz    MRN: [id number]    : 1960    Room: [address] [place] [address]      Evaluating OT: Laura Shruthi, [address] OTR/L; [id number]      Referring Provider: TOÑO Peoples    Specific Provider Orders/Date: OT Eval and Treat 21      Diagnosis: PVC   Frequent PVCs    Chest Pain    Surgery: none this admission    Pertinent Medical History:  has no past medical history on file.      Recommended Adaptive Equipment: shower chair     Precautions:  Fall Risk,      Assessment of current deficits    [x] Functional mobility  [x]ADLs  [x] Strength               []Cognition    [x] Functional transfers   [x] IADLs         [x] Safety Awareness   [x]Endurance    [] Fine Coordination              [x] Balance      [] Vision/perception   []Sensation     []Gross Motor Coordination  [] ROM  [] Delirium                   [] Motor Control     OT PLAN OF CARE   OT POC based on physician orders, patient diagnosis and results of clinical assessment    Frequency/Duration: 1-3 days/wk for 2 weeks PRN   Specific OT Treatment Interventions to include:   * Instruction/training on adapted ADL techniques and AE recommendations to increase functional independence within precautions       * Training on energy conservation strategies, correct breathing pattern and techniques to improve independence/tolerance for self-care routine  * Functional transfer/mobility training/DME recommendations for increased independence, safety, and fall prevention  * Patient/Family education to increase follow through with safety techniques and functional independence  * Recommendation of environmental modifications for increased safety with functional transfers/mobility and ADLs  * Therapeutic exercise to improve motor endurance, ROM, and functional strength for ADLs/functional transfers  * Therapeutic activities to facilitate/challenge dynamic balance, stand tolerance for increased safety and independence with ADLs    Home Living: Pt lives with wife in split level home with 2+3 stairs to enter with BHR. Bedroom and 1/2 bath on lower floor with 6 steps and 1 HR to access. Full bathroom on upper level 6+6 steps with 1HR to access. Bathroom setup: tub/shower unit   Equipment owned: none    Prior Level of Function: independent with ADLs , independnet with IADLs - assist from wife with cooking/cleaning/laundry; ambulated with no AD prior  Driving: yes  Occupation:     Pain Level: 0/10  Cognition: A&O: 4/4; Follows multi step directions   Memory:  good   Sequencing:  Fair+   Problem solving:  Fair+   Judgement/safety:  Fair+     Functional Assessment:  AM-PAC Daily Activity Raw Score: 22/24   Initial Eval Status  Date: 9/10/21 Treatment Status  Date: STGs = LTGs  Time frame: 10-14 days   Feeding Independent      Grooming Independent     UB Dressing Independent       LB Dressing Stand by Assist   Ariel/doff socks seated EOB  Independent    Bathing Stand by Assist  Educated on use of shower chair for energy conservation to reduce fatigue/SOB with prolonged tasks  Independent    Toileting Independent       Bed Mobility  Log Roll: NT  Supine to sit:  Independent   Sit to supine: Independent      Functional Transfers Sit to stand: supervision   Stand to sit: supervision  Stand pivot: supervision  Commode: supervision  Sit to stand: ind   Stand to sit: ind  Stand pivot: ind  Commode: ind    Functional Mobility Supervision no AD   within room/bathroom    Independent with no AD   Balance Sitting:     Static - IND     Dynamic - IND  Standing: Supervision  Standing: Independent   Activity Tolerance Fair  Limited d/t fatigue/decreased endurance  Good   Visual/  Perceptual Glasses: no          Vitals SpO2 at rest: 95%  SpO2 following activity: 94%       Hand Dominance: Right   AROM (PROM) Strength Additional Info:  Goal:   RUE  WFL 4+/5 good  and wfl FMC/dexterity noted during ADL tasks   Improve overall RUE strength to 5/5 for participation in functional tasks       LUE WFL 4+/5 Good  and wfl FMC/dexterity noted during ADL tasks   Improve overall LUE strength to 5/5 for participation in functional tasks       Hearing: [facility]   Sensation:  No c/o numbness or tingling   Tone: WFL   Edema: unremarkable    Comment: Cleared by RN to see pt. Upon arrival patient lying supine in bed with wife present and agreeable to OT session. At end of session, patient seated EOB with wife present with call light and phone within reach, all lines and tubes intact. Overall patient demonstrated decreased independence and safety during completion of ADL/functional transfer/mobility tasks. Pt would benefit from continued skilled OT to increase safety and independence with completion of ADL/IADL tasks for functional independence and quality of life. Treatment: OT treatment provided this date includes:    ADL-  Instruction/training on safety and adapted techniques for completion of ADLs - tracee/doff socks seated EOB, toileting task and hand hygiene standing sink side, pt educated on use of shower chair to assist with decreased endurance and increased SOB    Mobility-  Instruction/training on safety and improved independence with bed mobility/functional transfers and functional mobility - educated on safety with mobility no noted LOB or decreased functional mobility this date- pt feels at baseline with exception of increased SOB with prolonged tasks and ambulation including stairs    Sitting EOB x 5 minutes to improve dynamic sitting balance and activity tolerance during ADLs.     Activity tolerance- Instruction/training on energy conservation/work simplification for completion of ADLs - limited d/t SOB        Rehab Potential: Good  for established goals     LTG: maximize independence with ADLs to return to PLOF    Patient and/or family were instructed on functional diagnosis, prognosis/goals and OT plan of care. Demonstrated fair understanding. [] Malnutrition indicators have been identified and nursing has been notified to ensure a dietitian consult is ordered. ·  Eval Complexity: Low     Evaluation time includes thorough review of current medical information, gathering information on past medical & social history & PLOF, completion of standardized testing, informal observation of tasks, consultation with other medical professions/disciplines, assessment of data & development of POC/goals. Time In: 1128  Time Out: 1138  Total Treatment Time: none    Min Units   OT Eval Low 85370  x     OT Eval Medium 24994      OT Eval High 98598      OT Re-Eval [id number]       Therapeutic Ex [id number]       Therapeutic Activities 57342       ADL/Self Care 96396       Orthotic Management 91679       Manual 90335     Neuro Re-Ed 14456       Non-Billable Time          Evaluation Time additionally includes thorough review of current medical information, gathering information on past medical history/social history and prior level of function, interpretation of standardized testing/informal observation of tasks, assessment of data and development of plan of care and goals.       SUZANNE Rowe OTR/L; [id number]

## 2021-09-10 NOTE — PROCEDURES
CARDIAC CATHETERIZATION REPORT    : Corrina Smart MD     Date of procedure: 09/10/21     Indication:  1. Abnormal MPI    Procedures:  Left heart catheterization and Coronary angiogram     Sedation/analgesia:  Midazolam IV     Time sedation was administered: 1457. I was present in the room when sedation was administered. Procedure end time: 5108  Time spent with face to face monitoring of moderate sedation: 9 minutes    Brief history:  69-year-old  male, smoker, drinks 6 beers a day, who presented with dyspnea on exertion and was noted to have very frequent PVCs which worsened during treadmill stress. His MPI revealed a fixed apical perfusion defect with minimal reversibility. His TTE revealed mildly reduced LV systolic function. Description of procedure: The patient presented to the Cath Lab in a(n) elective fashion. The patient was prepped and draped in a sterile manner. Timeout, airway and ASA assessment were completed. Local anesthesia with 1% lidocaine was administered and sedation/analgesia were administered as above. Access was obtained using the modified Seldinger technique and a micropuncture needle in the right radial artery. A 6 [place] slender sheath was inserted over a wire. Diagnostic angiography was performed using 5F Murphy diagnostic catheters. Coronary anatomy:  Dominance: Right  1. Left main: Angiographically unremarkable  2. LAD: Large vessel that supplies the entire apex. It gives rise to a large bifurcating first diagonal.  In its distal portion it gives rise to a very large bifurcating septal branch. The LAD has mild diffuse disease  3. Left circumflex: This is a smaller vessel with a small OM. Has no significant disease angiographically  4. RCA: This is a large tortuous vessel with a large RPDA and large terminal RPL of multiple smaller RPL branches.   It has minimal luminal irregularities    Hemodynamics:  LVEDP 15  Ao: 124/63 with a mean of 106      Hemostasis:  Transradial band was applied for patent arterial hemostasis. Complications: None  Estimated blood loss: Minimal  Contrast used: 40 mL  Air kerma: 203 mGy    Conclusions:  1. Minimal epicardial CAD  2. Borderline elevated left filling pressure      PLAN:  1. Recommend CMR or cardiac PET to rule out infiltrative cardiomyopathy or myocarditis. Otherwise a presumptive diagnosis of alcoholic cardiomyopathy may be made.   2. Consider a wearable cardioverter defibrillator on discharge unless a treatable etiology of the PVCs is identified    Bear Carroll MD, [facility] [place]  Interventional Cardiology/Structural Heart Disease

## 2021-09-10 NOTE — PROGRESS NOTES
Inpatient Cardiology Progress note     PATIENT IS BEING FOLLOWED FOR: PVCs, abnormal stress test    Nettie Henning is a 63 y.o. male who is new to [facility] cardiology and will be followed by      SUBJECTIVE: He remains dyspneic at times especially with exertion but no chest pain  OBJECTIVE: No apparent distress     ROS:  Consist: Denies fevers, chills or night sweats  Heart: Denies chest pain, palpitations, lightheadedness, dizziness or syncope  Lungs: Denies SOB, cough, wheezing, orthopnea or PND  GI: Denies abdominal pain, vomiting or diarrhea    PHYSICAL EXAM:   BP (!) 127/90   Pulse 87   Temp 97.5 °F (36.4 °C) (Oral)   Resp 18   Ht 6' (1.829 m)   Wt 193 lb (87.5 kg)   SpO2 95%   BMI 26.18 kg/m²    B/P Range last 24 hours: Systolic (39EKW), BWJ:880 , Min:122 , XZQ:952    Diastolic (77UFI), XAM:83, Min:70, Max:98    CONST: Well developed, well nourished who appears stated age. Awake, alert and cooperative. No apparent distress  HEENT:   Head- Normocephalic, atraumatic   Eyes- Conjunctivae pink, anicteric  Throat- Oral mucosa pink and moist  Neck-  No stridor, trachea midline, no jugular venous distention. No carotid bruit  CHEST: Chest symmetrical and non-tender to palpation. No accessory muscle use or intercostal retractions  RESPIRATORY:  Lung sounds - clear throughout fields   CARDIOVASCULAR:     Heart Inspection- shows no noted pulsations  Heart Palpation- no heaves or thrills; PMI is non-displaced   Heart Ausculation- Regular rate and rhythm, no murmur. No s3, s4 or rub   PV: No lower extremity edema. No varicosities. Pedal pulses palpable, no clubbing or cyanosis   ABDOMEN: Soft, non-tender to light palpation. Bowel sounds present. No palpable masses no organomegaly; no abdominal bruit  MS: Good muscle strength and tone. No atrophy or abnormal movements. : Deferred  SKIN: Warm and dry no statis dermatitis or ulcers   NEURO / PSYCH: Oriented to person, place and time.  Speech clear and appropriate. Follows all commands. Pleasant affect       Intake/Output Summary (Last 24 hours) at 9/10/2021 1159  Last data filed at 9/9/2021 1615  Gross per 24 hour   Intake 240 ml   Output --   Net 240 ml       Weight:   Wt Readings from Last 3 Encounters:   09/08/21 193 lb (87.5 kg)     Current Inpatient Medications:   sodium chloride flush  5-40 mL IntraVENous 2 times per day    enoxaparin  40 mg SubCUTAneous Daily    famotidine  20 mg Oral Daily    nicotine  1 patch TransDERmal Daily       IV Infusions (if any):   sodium chloride         DIAGNOSTIC/ LABORATORY DATA:  Labs:   CBC:   Recent Labs     09/09/21  0630 09/10/21  0523   WBC 11.9* 10.7   HGB 14.4 15.4   HCT 42.4 46.5    259     BMP:   Recent Labs     09/09/21  0630 09/10/21  0523    139   K 4.4 5.0   CO2 23 26   BUN 21 17   CREATININE 0.8 0.9   LABGLOM >60 >60   CALCIUM 9.0 9.5     Mag:   Recent Labs     09/08/21  1636   MG 2.2     Phos: No results for input(s): PHOS in the last 72 hours. TFT:   Lab Results   Component Value Date    TSH 2.910 09/09/2021    T4FREE 1.23 09/09/2021      HgA1c:   Lab Results   Component Value Date    LABA1C 6.0 (H) 09/09/2021     No results found for: EAG    BNP: No results for input(s): BNP in the last 72 hours. PT/INR:   Recent Labs     09/09/21  0630   PROTIME 10.8   INR 1.0     APTT:  Recent Labs     09/09/21  0630   APTT 23.3*     CARDIAC ENZYMES:  Recent Labs     09/08/21  1636 09/09/21  0630   TROPHS 7 9     FASTING LIPID PANEL:  Lab Results   Component Value Date    CHOL 184 09/09/2021    HDL 63 09/09/2021    LDLCALC 87 09/09/2021    TRIG 171 09/09/2021     LIVER PROFILE:  Recent Labs     09/09/21  0630 09/10/21  0523   AST 13 13   ALT 15 14   LABALBU 3.8 3.8           Telemetry: Sinus rhythm with PVCs    Echo: Pending    ASSESSMENT:       1. Frequent PVC's  2. + ELAM  3. HA's and lightheadedness  4. + Night sweats  5. Tobacco abuse  6. ETOH abuse  7. Abnormal CT of the chest       PLAN:  1.  Risks versus benefits of left heart cath and possible PCI were discussed and he is willing to proceed. Alternative medical procedure such as CTA of the coronaries was also discussed. The risks discussed were sudden death, stroke, myocardial infarction, dye allergy, infection, bleeding, arrhythmia, vascular injury    Electronically signed by KAPIL Bailey CNP on 9/10/2021 at 11:59 AM    Patient seen and examined case discussed in detail with cardiology nurse practitioner and agree with assessment and plan and history and physical examination discussed in detail and documented above. I also talked to patient and wife in detail and answered their questions. Invasive coronary angiogram September 10, 2021:  Conclusions:   1. Minimal epicardial CAD   2. Borderline elevated left filling pressure   PLAN:   1. Recommend CMR or cardiac PET to rule out infiltrative   cardiomyopathy or myocarditis.  Otherwise a presumptive diagnosis   of alcoholic cardiomyopathy may be made. 2. Consider a wearable cardioverter defibrillator on discharge   unless a treatable etiology of the PVCs is identified     Transthoracic echocardiogram October 10, 2021:   Summary   Technically difficult examination. Stage I diastolic dysfunction. Ejection fraction is visually estimated at45n to 50%. Cannot rule our LV segmental wall motion abnormalities. The right ventricle was not clearly visualized but grossly appears normal   in size and systolic function,       Plan: Patient may be discharged once stable from cardiac catheterization procedure from cardiovascular perspective as long as there is no other medical condition needing to be addressed by primary service. Once patient is stable to be discharged to follow-up with cardiology and patient will need outpatient Zio patch heart monitor for further evaluation.

## 2021-09-10 NOTE — PLAN OF CARE
Problem: Falls - Risk of:  Goal: Will remain free from falls  Description: Will remain free from falls  9/10/2021 0113 by Ekaterina Devine RN  Outcome: Met This Shift     Problem: Falls - Risk of:  Goal: Absence of physical injury  Description: Absence of physical injury  9/10/2021 0113 by Ekaterina Devine RN  Outcome: Met This Shift

## 2021-09-10 NOTE — PROGRESS NOTES
Chief Complaint:  Chief Complaint   Patient presents with    Chest Pain     was seen at doctor for chest pain x1 week with shortness of breath. sent in for abnormal ekg     Frequent PVCs     Subjective:    Mild dyspnea persists, no new complaints today    Objective:    BP (!) 127/90   Pulse 87   Temp 97.5 °F (36.4 °C) (Oral)   Resp 18   Ht 6' (1.829 m)   Wt 193 lb (87.5 kg)   SpO2 95%   BMI 26.18 kg/m²     Current medications that patient is taking have been reviewed.     General appearance: NAD, conversant  HEENT: AT/NC, MMM  Neck: FROM, supple  Lungs: Clear to auscultation, WOB normal  CV: RRR, no MRGs  Abdomen: Soft, non-tender; no masses or HSM, +BS  Extremities: No peripheral edema or digital cyanosis  Skin: no rash, lesions or ulcers  Psych: Calm and cooperative  Neuro: Alert and interactive, face symmetric, moving all extremities, speech fluent    Labs:  CBC with Differential:    Lab Results   Component Value Date    WBC 10.7 09/10/2021    RBC 4.95 09/10/2021    HGB 15.4 09/10/2021    HCT 46.5 09/10/2021     09/10/2021    MCV 93.9 09/10/2021    MCH 31.1 09/10/2021    MCHC 33.1 09/10/2021    RDW 14.6 09/10/2021    LYMPHOPCT 25.8 09/10/2021    MONOPCT 9.6 09/10/2021    BASOPCT 0.4 09/10/2021    MONOSABS 1.03 09/10/2021    LYMPHSABS 2.76 09/10/2021    EOSABS 0.17 09/10/2021    BASOSABS 0.04 09/10/2021     CMP:    Lab Results   Component Value Date     09/10/2021    K 5.0 09/10/2021     09/10/2021    CO2 26 09/10/2021    BUN 17 09/10/2021    CREATININE 0.9 09/10/2021    GFRAA >60 09/10/2021    LABGLOM >60 09/10/2021    GLUCOSE 101 09/10/2021    PROT 6.7 09/10/2021    LABALBU 3.8 09/10/2021    CALCIUM 9.5 09/10/2021    BILITOT 0.5 09/10/2021    ALKPHOS 81 09/10/2021    AST 13 09/10/2021    ALT 14 09/10/2021        Imaging:  I've personally reviewed the patient's CT chest     No CT evidence of neoplastic process.       There is mild diffuse ground-glass opacity in bilateral posterior lower

## 2021-09-10 NOTE — CARE COORDINATION
Social Work Discharge/Planning:    SW attempted to meet with patient, patient is off the floor located in the cath lab for a cardiac cath. JEFF/ANGELIQUE to follow.      Cali Lazo, KHANG  [phone]

## 2021-09-11 VITALS
HEART RATE: 76 BPM | DIASTOLIC BLOOD PRESSURE: 82 MMHG | WEIGHT: 193 LBS | RESPIRATION RATE: 18 BRPM | SYSTOLIC BLOOD PRESSURE: 138 MMHG | BODY MASS INDEX: 26.14 KG/M2 | OXYGEN SATURATION: 97 % | TEMPERATURE: 96.6 F | HEIGHT: 72 IN

## 2021-09-11 LAB
ANION GAP SERPL CALCULATED.3IONS-SCNC: 10 MMOL/L (ref 7–16)
BASOPHILS ABSOLUTE: 0.03 E9/L (ref 0–0.2)
BASOPHILS RELATIVE PERCENT: 0.4 % (ref 0–2)
BUN BLDV-MCNC: 16 MG/DL (ref 6–23)
CALCIUM SERPL-MCNC: 9 MG/DL (ref 8.6–10.2)
CHLORIDE BLD-SCNC: 103 MMOL/L (ref 98–107)
CO2: 25 MMOL/L (ref 22–29)
CREAT SERPL-MCNC: 0.8 MG/DL (ref 0.7–1.2)
EOSINOPHILS ABSOLUTE: 0.15 E9/L (ref 0.05–0.5)
EOSINOPHILS RELATIVE PERCENT: 1.8 % (ref 0–6)
GFR AFRICAN AMERICAN: >60
GFR NON-AFRICAN AMERICAN: >60 ML/MIN/1.73
GLUCOSE BLD-MCNC: 101 MG/DL (ref 74–99)
HCT VFR BLD CALC: 43.7 % (ref 37–54)
HEMOGLOBIN: 15 G/DL (ref 12.5–16.5)
IMMATURE GRANULOCYTES #: 0.06 E9/L
IMMATURE GRANULOCYTES %: 0.7 % (ref 0–5)
LYMPHOCYTES ABSOLUTE: 2.04 E9/L (ref 1.5–4)
LYMPHOCYTES RELATIVE PERCENT: 24.4 % (ref 20–42)
MCH RBC QN AUTO: 31.8 PG (ref 26–35)
MCHC RBC AUTO-ENTMCNC: 34.3 % (ref 32–34.5)
MCV RBC AUTO: 92.8 FL (ref 80–99.9)
MONOCYTES ABSOLUTE: 0.79 E9/L (ref 0.1–0.95)
MONOCYTES RELATIVE PERCENT: 9.4 % (ref 2–12)
NEUTROPHILS ABSOLUTE: 5.29 E9/L (ref 1.8–7.3)
NEUTROPHILS RELATIVE PERCENT: 63.3 % (ref 43–80)
PDW BLD-RTO: 14.2 FL (ref 11.5–15)
PLATELET # BLD: 242 E9/L (ref 130–450)
PMV BLD AUTO: 9.7 FL (ref 7–12)
POTASSIUM SERPL-SCNC: 4.5 MMOL/L (ref 3.5–5)
RBC # BLD: 4.71 E12/L (ref 3.8–5.8)
SODIUM BLD-SCNC: 138 MMOL/L (ref 132–146)
WBC # BLD: 8.4 E9/L (ref 4.5–11.5)

## 2021-09-11 PROCEDURE — 6370000000 HC RX 637 (ALT 250 FOR IP): Performed by: PHYSICIAN ASSISTANT

## 2021-09-11 PROCEDURE — 80048 BASIC METABOLIC PNL TOTAL CA: CPT

## 2021-09-11 PROCEDURE — 85025 COMPLETE CBC W/AUTO DIFF WBC: CPT

## 2021-09-11 PROCEDURE — 99232 SBSQ HOSP IP/OBS MODERATE 35: CPT | Performed by: INTERNAL MEDICINE

## 2021-09-11 PROCEDURE — 2580000003 HC RX 258: Performed by: INTERNAL MEDICINE

## 2021-09-11 PROCEDURE — 36415 COLL VENOUS BLD VENIPUNCTURE: CPT

## 2021-09-11 PROCEDURE — 6370000000 HC RX 637 (ALT 250 FOR IP): Performed by: INTERNAL MEDICINE

## 2021-09-11 RX ORDER — LISINOPRIL 2.5 MG/1
2.5 TABLET ORAL DAILY
Qty: 30 TABLET | Refills: 3 | Status: SHIPPED | OUTPATIENT
Start: 2021-09-11

## 2021-09-11 RX ORDER — FAMOTIDINE 20 MG/1
20 TABLET, FILM COATED ORAL DAILY
Qty: 60 TABLET | Refills: 3 | Status: SHIPPED | OUTPATIENT
Start: 2021-09-12 | End: 2022-07-28

## 2021-09-11 RX ORDER — LISINOPRIL 5 MG/1
2.5 TABLET ORAL DAILY
Status: DISCONTINUED | OUTPATIENT
Start: 2021-09-11 | End: 2021-09-11 | Stop reason: HOSPADM

## 2021-09-11 RX ORDER — ASPIRIN 81 MG/1
81 TABLET, CHEWABLE ORAL DAILY
Qty: 30 TABLET | Refills: 3 | Status: SHIPPED | OUTPATIENT
Start: 2021-09-12 | End: 2022-10-05

## 2021-09-11 RX ORDER — PREDNISONE 20 MG/1
10 TABLET ORAL DAILY
Qty: 10 TABLET | Refills: 0 | Status: SHIPPED | OUTPATIENT
Start: 2021-09-11 | End: 2022-07-28

## 2021-09-11 RX ORDER — NICOTINE 21 MG/24HR
1 PATCH, TRANSDERMAL 24 HOURS TRANSDERMAL DAILY
Qty: 30 PATCH | Refills: 3 | Status: SHIPPED | OUTPATIENT
Start: 2021-09-12 | End: 2022-07-28

## 2021-09-11 RX ORDER — METOPROLOL SUCCINATE 25 MG/1
25 TABLET, EXTENDED RELEASE ORAL DAILY
Qty: 30 TABLET | Refills: 3 | Status: SHIPPED | OUTPATIENT
Start: 2021-09-12 | End: 2021-11-05 | Stop reason: SDUPTHER

## 2021-09-11 RX ADMIN — METOPROLOL SUCCINATE 25 MG: 25 TABLET, EXTENDED RELEASE ORAL at 08:07

## 2021-09-11 RX ADMIN — SODIUM CHLORIDE, PRESERVATIVE FREE 10 ML: 5 INJECTION INTRAVENOUS at 08:10

## 2021-09-11 RX ADMIN — FAMOTIDINE 20 MG: 20 TABLET, FILM COATED ORAL at 08:07

## 2021-09-11 RX ADMIN — ASPIRIN 81 MG: 81 TABLET, CHEWABLE ORAL at 08:06

## 2021-09-11 RX ADMIN — ACETAMINOPHEN 650 MG: 325 TABLET ORAL at 08:06

## 2021-09-11 ASSESSMENT — PAIN SCALES - GENERAL
PAINLEVEL_OUTOF10: 0
PAINLEVEL_OUTOF10: 2
PAINLEVEL_OUTOF10: 0
PAINLEVEL_OUTOF10: 0

## 2021-09-11 NOTE — DISCHARGE INSTR - COC
Continuity of Care Form    Patient Name: Kalyan Pastrana   :  1960  MRN:  [id number]    Admit date:  2021  Discharge date:  ***    Code Status Order: Full Code   Advance Directives:      Admitting Physician:  Felicia Norman MD  PCP: Elenita Padgett DO    Discharging Nurse: [facility] Unit/Room#: 2318/3302-V  Discharging Unit Phone Number: ***    Emergency Contact:   Extended Emergency Contact Information  Primary Emergency Contact: Rupinder Bishop Wes [address]oks [address] Phone: [phone]  Relation: Spouse  Preferred language: English   needed? No    Past Surgical History:  History reviewed. No pertinent surgical history.     Immunization History:   Immunization History   Administered Date(s) Administered    SARS-COV-2 (COVID-19) Vaccine, Unspecified 2021       Active Problems:  Patient Active Problem List   Diagnosis Code    Frequent PVCs I49.3    PVC (premature ventricular contraction) I49.3    Chest pain R07.9    Chronic systolic (congestive) heart failure (HCC) I50.22    Unstable angina ([address] I20.0       Isolation/Infection:   Isolation            No Isolation          Patient Infection Status       Infection Onset Added Last Indicated Last Indicated By Review Planned Expiration Resolved Resolved By    None active    Resolved    COVID-19 Rule Out 21 Respiratory Panel, Molecular, with COVID-19 (Restricted: peds pts or suitable admitted adults) (Ordered)   21 Rule-Out Test Resulted            Nurse Assessment:  Last Vital Signs: /82   Pulse 76   Temp 96.6 °F (35.9 °C) (Temporal)   Resp 18   Ht 6' (1.829 m)   Wt 193 lb (87.5 kg)   SpO2 97%   BMI 26.18 kg/m²     Last documented pain score (0-10 scale): Pain Level: 2  Last Weight:   Wt Readings from Last 1 Encounters:   21 193 lb (87.5 kg)     Mental Status:  {IP PT MENTAL STATUS::::0}    IV Access:  { CHIP IV ACCESS:880081370:::0}    Nursing Mobility/ADLs:  Walking   {P DME ADLs:699554964:::0}  Transfer  {CHP DME ADLs:929967810:::0}  Bathing  {CHP DME ADLs:538968531:::0}  Dressing  {CHP DME ADLs:696055667:::0}  Toileting  {CHP DME ADLs:751431860:::0}  Feeding  {CHP DME ADLs:867589718:::0}  Med Admin  {CHP DME ADLs:675031688:::0}  Med Delivery   {[facility] MED Delivery:929808503:::0}    Wound Care Documentation and Therapy:        Elimination:  Continence: Bowel: {YES / GY:44729}  Bladder: {YES / NB:21978}  Urinary Catheter: {Urinary Catheter:225218156:::0}   Colostomy/Ileostomy/Ileal Conduit: {YES / HU:68269}       Date of Last BM: ***    Intake/Output Summary (Last 24 hours) at 2021 1220  Last data filed at 2021 0956  Gross per 24 hour   Intake 360 ml   Output --   Net 360 ml     I/O last 3 completed shifts:   In: 240 [P.O.:240]  Out: -     Safety Concerns:     [address] Safety Concerns:605121806:::0}    Impairments/Disabilities:      [address] Impairments/Disabilities:123589025:::0}    Nutrition Therapy:  Current Nutrition Therapy:   [address] Diet List:610802958:::0}    Routes of Feeding: {CHP DME Other Feedings:133198784:::0}  Liquids: {Slp liquid thickness:89109}  Daily Fluid Restriction: {CHP DME Yes amt example:469079615:::0}  Last Modified Barium Swallow with Video (Video Swallowing Test): {Done Not Done KOEW:373385926:::9}    Treatments at the Time of Hospital Discharge:   Respiratory Treatments: ***  Oxygen Therapy:  {Therapy; copd oxygen:86005:::0}  Ventilator:    { CC Vent List:586081876:::0}    Rehab Therapies: {THERAPEUTIC INTERVENTION:3586522810}  Weight Bearing Status/Restrictions: { CC Weight Bearin:::0}  Other Medical Equipment (for information only, NOT a DME order):  {EQUIPMENT:935112191}  Other Treatments: ***    Patient's personal belongings (please select all that are sent with patient):  {CHP DME Belongings:777414587:::0}    RN SIGNATURE:  {Esignature:881400954:::0}    CASE MANAGEMENT/SOCIAL WORK SECTION    Inpatient Status Date: ***    Readmission Risk Assessment Score:  Readmission Risk              Risk of Unplanned Readmission:  11           Discharging to Facility/ Agency   Name:   Address:  Phone:  Fax:    Dialysis Facility (if applicable)   Name:  Address:  Dialysis Schedule:  Phone:  Fax:    / signature: {Esignature:789463094:::0}    PHYSICIAN SECTION    Prognosis: {Prognosis:0656320297:::0}    Condition at Discharge: Vel Finn Patient Condition:012027814:::0}    Rehab Potential (if transferring to Rehab): {Prognosis:6382646451:::0}    Recommended Labs or Other Treatments After Discharge: ***    He is to see his PCP to obtain Pulmonary consult to follow up on CT scan    Physician Certification: I certify the above information and transfer of Gloria Aiken  is necessary for the continuing treatment of the diagnosis listed and that he requires {Admit to Appropriate Level of Care:09446:::0} for {GREATER/LESS:734349207} 30 days.      Update Admission H&P: {CHP DME Changes in HandP:651724130:::0}    PHYSICIAN SIGNATURE:  {Esignature:923140184:::0}

## 2021-09-11 NOTE — PATIENT CARE CONFERENCE
[facility] Quality Flow/Interdisciplinary Rounds Progress Note        Quality Flow Rounds held on September 11, 2021    Disciplines Attending:  Bedside Nurse, ,  and Nursing Unit Leadership    Soha Levy was admitted on 9/8/2021  8:36 PM    Anticipated Discharge Date:  Expected Discharge Date: 09/10/21    Disposition:    Bereket Score:  Bereekt Scale Score: 21    Readmission Risk              Risk of Unplanned Readmission:  10           Discussed patient goal for the day, patient clinical progression, and barriers to discharge.   The following Goal(s) of the Day/Commitment(s) have been identified:  Diagnostics - Report Results      Miles Callahan RN  September 11, 2021

## 2021-09-11 NOTE — PROGRESS NOTES
Chief Complaint:  Chief Complaint   Patient presents with    Chest Pain     was seen at doctor for chest pain x1 week with shortness of breath. sent in for abnormal ekg     Unstable angina (HCC)     Subjective:  Mild dyspnea persists, no new complaints today    Objective:    /82   Pulse 76   Temp 96.6 °F (35.9 °C) (Temporal)   Resp 18   Ht 6' (1.829 m)   Wt 193 lb (87.5 kg)   SpO2 97%   BMI 26.18 kg/m²     Current medications that patient is taking have been reviewed.     General appearance: NAD, conversant  HEENT: AT/NC, MMM  Neck: FROM, supple  Lungs: Patient has mild/moderate diffuse sibilants right greater than left  CV: RRR, no MRGs  Abdomen: Soft, non-tender; no masses or HSM, +BS  Extremities: No peripheral edema or digital cyanosis  Skin: no rash, lesions or ulcers  Psych: Calm and cooperative  Neuro: Alert and interactive, face symmetric, moving all extremities, speech fluent    Labs:  CBC with Differential:    Lab Results   Component Value Date    WBC 8.4 09/11/2021    RBC 4.71 09/11/2021    HGB 15.0 09/11/2021    HCT 43.7 09/11/2021     09/11/2021    MCV 92.8 09/11/2021    MCH 31.8 09/11/2021    MCHC 34.3 09/11/2021    RDW 14.2 09/11/2021    LYMPHOPCT 24.4 09/11/2021    MONOPCT 9.4 09/11/2021    BASOPCT 0.4 09/11/2021    MONOSABS 0.79 09/11/2021    LYMPHSABS 2.04 09/11/2021    EOSABS 0.15 09/11/2021    BASOSABS 0.03 09/11/2021     CMP:    Lab Results   Component Value Date     09/11/2021    K 4.5 09/11/2021    K 5.0 09/10/2021     09/11/2021    CO2 25 09/11/2021    BUN 16 09/11/2021    CREATININE 0.8 09/11/2021    GFRAA >60 09/11/2021    LABGLOM >60 09/11/2021    GLUCOSE 101 09/11/2021    PROT 6.7 09/10/2021    LABALBU 3.8 09/10/2021    CALCIUM 9.0 09/11/2021    BILITOT 0.5 09/10/2021    ALKPHOS 81 09/10/2021    AST 13 09/10/2021    ALT 14 09/10/2021        Imaging:  I've personally reviewed the patient's CT chest     No CT evidence of neoplastic process.       There is mild diffuse ground-glass opacity in bilateral posterior lower   lobes, right greater than left, with some areas of mosaic pattern attenuation   in the right lower lobe, which may be related to small vessel or small   airways disease.  Findings are favored less likely to be related to an acute   infectious infiltrate.  However if symptoms persist, consider follow-up PA   and lateral radiographs to evaluate for developing infiltrate. I've personally reviewed the patient's telemetry  NSR persistent frequent PVCs    Cardiac catheterization shows minimal epicardial coronary artery disease    Cardiology has recommended CMR or cardiac PET to rule out infiltrative cardiomyopathy or myocarditis  Otherwise presumptive diagnosis of alcoholic cardiomyopathy  Consideration for wearable defibrillator on discharge    Assessment/Plan:  Principal Problem:    Unstable angina (HCC)  Active Problems:    Frequent PVCs    PVC (premature ventricular contraction)    Chest pain    Chronic systolic (congestive) heart failure ([address]  Resolved Problems:    * No resolved hospital problems. *     EF is 45-50%.   Clinically he is euvolemic    Add metoprolol and aspirin  Tobacco and alcohol cessation encouraged  Likely viral illness, supportive care for that  Requires continued inpatient level of care   Elaine Edwards MD    8:47 AM  9/11/2021

## 2021-09-11 NOTE — DISCHARGE SUMMARY
Discharge Summary    Alexandra Harding  :  1960  MRN:  [id number]    Admit date:  2021  Discharge date:  2021 12:11 PM    Admitting Physician:  Veronica Pike MD    Discharge Diagnoses:    Patient Active Problem List    Diagnosis Date Noted    Chronic systolic (congestive) heart failure ([facility] 09/10/2021    Unstable angina ([facility] 09/10/2021    Chest pain     Frequent PVCs 2021    PVC (premature ventricular contraction) 2021       Past Medical Hx :   Past Medical History:   Diagnosis Date    Chronic systolic (congestive) heart failure ([facility] 9/10/2021       Past Surgical Hx : History reviewed. No pertinent surgical history. Admission Condition:  poor    Discharged Condition:  good    Labs:  CBC:   Lab Results   Component Value Date    WBC 8.4 2021    RBC 4.71 2021    HGB 15.0 2021    HCT 43.7 2021    MCV 92.8 2021    MCH 31.8 2021    MCHC 34.3 2021    RDW 14.2 2021     2021    MPV 9.7 2021     CMP:    Lab Results   Component Value Date     2021    K 4.5 2021    K 5.0 09/10/2021     2021    CO2 25 2021    BUN 16 2021    CREATININE 0.8 2021    GFRAA >60 2021    LABGLOM >60 2021    GLUCOSE 101 2021    PROT 6.7 09/10/2021    LABALBU 3.8 09/10/2021    CALCIUM 9.0 2021    BILITOT 0.5 09/10/2021    ALKPHOS 81 09/10/2021    AST 13 09/10/2021    ALT 14 09/10/2021        Radiology Results: CT CHEST W CONTRAST    Result Date: 2021  EXAMINATION: CT OF THE CHEST WITH CONTRAST 2021 10:53 am TECHNIQUE: CT of the chest was performed with the administration of intravenous contrast. Multiplanar reformatted images are provided for review. Dose modulation, iterative reconstruction, and/or weight based adjustment of the mA/kV was utilized to reduce the radiation dose to as low as reasonably achievable. COMPARISON: None.  HISTORY: ORDERING SYSTEM PROVIDED HISTORY: night sweats, heavy smoker, dyspnea, r/o cancer TECHNOLOGIST PROVIDED HISTORY: Reason for exam:->night sweats, heavy smoker, dyspnea, r/o cancer What reading provider will be dictating this exam?->CRC FINDINGS: Mediastinum: Aorta is normal in caliber. No pulmonary arterial filling defects are seen. No pathologically enlarged hilar or mediastinal lymph nodes. Lungs/pleura: There are mild nonspecific posterior ground-glass opacities in the lower lobes, right greater than left, which are of uncertain chronicity, with a somewhat mosaic pattern. .  No focal pulmonary nodule is seen. No effusion or pneumothorax. No specific evidence for tuberculin infection. Upper Abdomen: Visualized portions of the upper abdomen are unremarkable. Soft Tissues/Bones: There are multilevel thoracic spine degenerative disc changes. No CT evidence of neoplastic process. There is mild diffuse ground-glass opacity in bilateral posterior lower lobes, right greater than left, with some areas of mosaic pattern attenuation in the right lower lobe, which may be related to small vessel or small airways disease. Findings are favored less likely to be related to an acute infectious infiltrate. However if symptoms persist, consider follow-up PA and lateral radiographs to evaluate for developing infiltrate. XR CHEST PORTABLE    Result Date: 9/8/2021  EXAMINATION: ONE XRAY VIEW OF THE CHEST 9/8/2021 9:14 pm COMPARISON: None. HISTORY: ORDERING SYSTEM PROVIDED HISTORY: Shortness of breath TECHNOLOGIST PROVIDED HISTORY: Reason for exam:->Shortness of breath What reading provider will be dictating this exam?->CRC FINDINGS: The lungs are without acute focal process. There is no effusion or pneumothorax. The cardiomediastinal silhouette is without acute process. The osseous structures are without acute process. No acute process.      NM Cardiac Stress Test Nuclear Imaging    Result Date: 9/9/2021  Indication:  Abnormal EKG with frequent premature complexes and dyspnea with exertion. Clinical History:   Patient has no known history of coronary artery disease. IMAGING: Myocardial perfusion imaging was performed at rest 30-35 minutes following the intravenous injection of 11.4 mCi of (Tc-Sestamibi) followed by 10 ml of Normal Saline. At peak exercise, the patient was injected intravenously with 34.5 mCi of (Tc-Sestamibi) followed by 10 ml of Normal Saline. Gated post-stress tomographic imaging was performed 20-25 minutes after stress. FINDINGS: The overall quality of the study was good. Left ventricular cavity size was noted to be normal. Rotational analog analysis demonstrated no patient motion, extracardiac activity or attenuation artifact. The gated SPECT stress imaging in the short, vertical long, and horizontal long axis demonstrated normal homogeneous tracer distribution throughout the myocardium. A mild defect was present in the mid to distal inferoseptal and anteroseptal wall(s) that was  medium sized by quantification. The resting images reveal minimal reversibility. Gated SPECT left ventricular ejection fraction was calculated to be 58% with hypokinesis of the mid to distal anteroseptal and inferior septal walls. TID ratio 0.84. Noncontrast chest CT: Moderate coronary artery calcification suggestive of moderate plaque burden. 1.  No stress-induced EKG changes at peak exercise. However, frequent ventricular ectopy in the form of multiple ventricular triplets/nonsustained VT noted. 2.  The myocardial perfusion imaging was abnormal. 3.  The abnormality was a medium-sized, mild perfusion defect involving the mid to distal inferior septum and anterior septum suggestive of prior infarct with minimal homa-infarct ischemia. 4.  Overall left ventricular systolic function was normal with regional wall motion abnormalities. 5.  No transient ischemic dilatation. 6.  Dumont treadmill score was 3.5 implying intermediate risk.   7.  Exercise capacity was below average. 8.  Intermediate risk general exercise treadmill test. Thank you for sending your patient to this [employer]. Amy Carmen MD, [facility] cardiology. Hospital Course:  Admitted for one week history if ELAM and chest pain  Defined history of tobacco and alcohol  Evaluation by cardiology:  Cardiac catheterization-  ASSESSMENT:  · Frequent ventricular ectopy including nonsustained VT with an abnormal stress test obstructive CAD was ruled out. 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 · Dyspnea with exertion multifactorial including diastolic dysfunction, tobacco use and COPD or chronic bronchitis  · Tobacco abuse  · Alcohol abuse  · Abnormal CT chest  ASSESSMENT:  · Frequent ventricular ectopy including nonsustained VT with an abnormal stress test obstructive CAD was ruled out. 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   · Dyspnea with exertion multifactorial including diastolic dysfunction, tobacco use and COPD or chronic bronchitis  · Tobacco abuse  · Alcohol abuse  · Abnormal CT chest    He was then discharged by cardiology with direction to follow up in one month  Reassess LV dysfunction and possibly explore less likely causes  Patient comfortable today  CT scan was reviewed- and he will need to follow up with PCP-for pulmonary referral and evaluation    Discharge Medications:    Michael Robbins   [place] Medication Instructions OET:[id number]    Printed on:09/11/21 1211   Medication Information                      aspirin 81 MG chewable tablet  Take 1 tablet by mouth daily             citalopram (CELEXA) 40 MG tablet  Take 1 tablet by mouth daily             famotidine (PEPCID) 20 MG tablet  Take 1 tablet by mouth daily             lisinopril (PRINIVIL;ZESTRIL) 2.5 MG tablet  Take 1 tablet by mouth daily metoprolol succinate (TOPROL XL) 25 MG extended release tablet  Take 1 tablet by mouth daily             nicotine (NICODERM CQ) 14 MG/24HR  Place 1 patch onto the skin daily             predniSONE (DELTASONE) 20 MG tablet  Take 0.5 tablets by mouth daily                 Discharge Exam:  /82   Pulse 76   Temp 96.6 °F (35.9 °C) (Temporal)   Resp 18   Ht 6' (1.829 m)   Wt 193 lb (87.5 kg)   SpO2 97%   BMI 26.18 kg/m²      Current medications that patient is taking have been reviewed.     General appearance: NAD, conversant  HEENT: AT/NC, MMM  Neck: FROM, supple  Lungs: Patient has mild/moderate diffuse sibilants right greater than left  CV: RRR, no MRGs  Abdomen: Soft, non-tender; no masses or HSM, +BS  Extremities: No peripheral edema or digital cyanosis  Skin: no rash, lesions or ulcers  Psych: Calm and cooperative  Neuro: Alert and interactive, face symmetric, moving all extremities, speech fluent      Disposition: home    Patient Instructions:   REFER TO AVR or CHIP document    He was then discharged by cardiology with direction to follow up in one month  Reassess LV dysfunction and possibly explore less likely causes  Patient comfortable today  CT scan was reviewed- and he will need to follow up with PCP-for pulmonary referral and evaluation    Signed:  [facility] [employer]  [employer]  9/11/2021, 12:11 PM

## 2021-09-11 NOTE — PROGRESS NOTES
INPATIENT CARDIOLOGY FOLLOW-UP    Name: Vincent Guzmán    Age: 63 y.o. Date of Admission: 9/8/2021  8:36 PM    Date of Service: 9/11/2021    Chief Complaint: Follow-up for abnormal stress test      Interim History:  No new overnight cardiac complaints except for dyspnea with exertion and feels a little congested and has wheezing. He was a chronic smoker. He denies any orthopnea, PND or leg edema. Cherl Spinner He is feeling much better after he was started on beta-blocker therapy and denies any more palpitations. Currently with no complaints of CP,palpitations, dizziness, or lightheadedness. SR on telemetry.     Review of Systems:   Cardiac: As per HPI  General: No fever, chills  Pulmonary: As per HPI  HEENT: No visual disturbances, difficult swallowing  GI: No nausea, vomiting  Endocrine: No thyroid disease or DM  Musculoskeletal: LANGFORD x 4, no focal motor deficits  Skin: Intact, no rashes  Neuro/Psych: No headache or seizures    Problem List:  Patient Active Problem List   Diagnosis    Frequent PVCs    PVC (premature ventricular contraction)    Chest pain    Chronic systolic (congestive) heart failure (HCC)    Unstable angina (HCC)       Allergies:  No Known Allergies    Current Medications:  Current Facility-Administered Medications   Medication Dose Route Frequency Provider Last Rate Last Admin    aspirin chewable tablet 81 mg  81 mg Oral Daily Radha Ramirez MD   81 mg at 09/11/21 0806    metoprolol succinate (TOPROL XL) extended release tablet 25 mg  25 mg Oral Daily Radha Ramirez MD   25 mg at 09/11/21 0807    sodium chloride flush 0.9 % injection 5-40 mL  5-40 mL IntraVENous 2 times per day Ayesha Carlson MD   10 mL at 09/11/21 0810    sodium chloride flush 0.9 % injection 5-40 mL  5-40 mL IntraVENous PRN Ayesha Carlson MD        0.9 % sodium chloride infusion  25 mL IntraVENous PRN Ayesha Carlson MD        acetaminophen (TYLENOL) tablet 650 mg  650 mg Oral Q4H PRN Ayesha Carlson MD        sodium chloride flush 0.9 % injection 5-40 mL  5-40 mL IntraVENous 2 times per day TOÑO Walden   10 mL at 09/10/21 2020    sodium chloride flush 0.9 % injection 5-40 mL  5-40 mL IntraVENous PRN TOÑO Walden        0.9 % sodium chloride infusion  25 mL IntraVENous PRN TOÑO Walden        enoxaparin (LOVENOX) injection 40 mg  40 mg SubCUTAneous Daily TOÑO Walden        ondansetron (ZOFRAN-ODT) disintegrating tablet 4 mg  4 mg Oral Q8H PRN TOÑO Walden        Or    ondansetron (ZOFRAN) injection 4 mg  4 mg IntraVENous Q6H PRN TOÑO Walden        polyethylene glycol (GLYCOLAX) packet 17 g  17 g Oral Daily PRN TOÑO Walden        acetaminophen (TYLENOL) tablet 650 mg  650 mg Oral Q6H PRN TOÑO Walden   650 mg at 09/11/21 0806    Or    acetaminophen (TYLENOL) suppository 650 mg  650 mg Rectal Q6H PRN TOÑO Walden        potassium chloride 10 mEq/100 mL IVPB (Peripheral Line)  10 mEq IntraVENous PRN TOÑO Walden        magnesium sulfate 2000 mg in 50 mL IVPB premix  2,000 mg IntraVENous PRN TOÑO Walden        famotidine (PEPCID) tablet 20 mg  20 mg Oral Daily TOÑO Walden   20 mg at 09/11/21 0807    nicotine (NICODERM CQ) 14 MG/24HR 1 patch  1 patch TransDERmal Daily TOÑO Walden   1 patch at 09/11/21 0807    perflutren lipid microspheres (DEFINITY) injection 1.65 mg  1.5 mL IntraVENous ONCE PRN Kerry Pelayo MD          sodium chloride      sodium chloride         Physical Exam:  /82   Pulse 76   Temp 96.6 °F (35.9 °C) (Temporal)   Resp 18   Ht 6' (1.829 m)   Wt 193 lb (87.5 kg)   SpO2 97%   BMI 26.18 kg/m²   Wt Readings from Last 3 Encounters:   09/08/21 193 lb (87.5 kg)     Appearance: Awake, alert, no acute respiratory distress  Skin: Intact, no rash  Head: Normocephalic, atraumatic  Eyes: EOMI, no conjunctival erythema  ENMT: No pharyngeal erythema, MMM, no rhinorrhea  Neck: Supple, no elevated JVP, no carotid bruits  Lungs: Has occasional scattered wheezing.   Cardiac: Regular rate and rhythm, +S1S2, no murmurs apparent  Abdomen: Soft, nontender, +bowel sounds  Extremities: Moves all extremities x 4, no lower extremity edema  Neurologic: No focal motor deficits apparent, normal mood and affect  Peripheral Pulses: Intact posterior tibial pulses bilaterally    Intake/Output:    Intake/Output Summary (Last 24 hours) at 9/11/2021 1130  Last data filed at 9/11/2021 0956  Gross per 24 hour   Intake 360 ml   Output --   Net 360 ml     I/O this shift:  In: 120 [P.O.:120]  Out: -     Laboratory Tests:  Recent Labs     09/09/21  0630 09/10/21  0523 09/11/21  0527    139 138   K 4.4 5.0 4.5    102 103   CO2 23 26 25   BUN 21 17 16   CREATININE 0.8 0.9 0.8   GLUCOSE 89 101* 101*   CALCIUM 9.0 9.5 9.0     Lab Results   Component Value Date    MG 2.2 09/08/2021     Recent Labs     09/08/21  1636 09/09/21  0630 09/10/21  0523   ALKPHOS 82 75 81   ALT 18 15 14   AST 17 13 13   PROT 6.9 6.5 6.7   BILITOT 0.3 0.5 0.5   LABALBU 3.9 3.8 3.8     Recent Labs     09/09/21 0630 09/10/21  0523 09/11/21  0527   WBC 11.9* 10.7 8.4   RBC 4.56 4.95 4.71   HGB 14.4 15.4 15.0   HCT 42.4 46.5 43.7   MCV 93.0 93.9 92.8   MCH 31.6 31.1 31.8   MCHC 34.0 33.1 34.3   RDW 14.8 14.6 14.2    259 242   MPV 9.7 9.8 9.7     No results found for: CKTOTAL, CKMB, CKMBINDEX, TROPONINI  Lab Results   Component Value Date    INR 1.0 09/09/2021    PROTIME 10.8 09/09/2021     Lab Results   Component Value Date    TSH 2.910 09/09/2021     Lab Results   Component Value Date    LABA1C 6.0 (H) 09/09/2021     No results found for: EAG  Lab Results   Component Value Date    CHOL 184 09/09/2021     Lab Results   Component Value Date    TRIG 171 (H) 09/09/2021     Lab Results   Component Value Date    HDL 63 09/09/2021     Lab Results   Component Value Date    LDLCALC 87 09/09/2021     Lab Results   Component Value Date    LABVLDL 34 09/09/2021     No results found for: CHOLHDLRATIO    Cardiac Tests:  ECG: Sinus tachycardia with frequent premature ventricular complexes, otherwise normal EKG      Telemetry findings reviewed: Sinus rhythm heart rate in the 70s with frequent premature ventricular complexes in the form of couplets. Vitals and labs were reviewed: As above pressure 138/82 heart rate 76 sats 97%    Labs-BMP normal, LDL 87 HDL 63 cholesterol 184 triglyceride 171, liver function normal TSH 1.2 A1c 6 CBC was normal.      Echocardiogram-9/10/2021:   Summary   Technically difficult examination. Stage I diastolic dysfunction. Ejection fraction is visually estimated at45n to 50%. Cannot rule our LV segmental wall motion abnormalities. The right ventricle was not clearly visualized but grossly appears normal   in size and systolic function,    Stress test-9/9/2021:    1.  No stress-induced EKG changes at peak exercise. However, frequent   ventricular ectopy in the form of multiple ventricular   triplets/nonsustained VT noted. 2.  The myocardial perfusion imaging was abnormal.   3.  The abnormality was a medium-sized, mild perfusion defect   involving the mid to distal inferior septum and anterior septum   suggestive of prior infarct with minimal homa-infarct ischemia. 4.  Overall left ventricular systolic function was normal with   regional wall motion abnormalities. 5.  No transient ischemic dilatation. 6.  Dumont treadmill score was 3.5 implying intermediate risk.     7.  Exercise capacity was below average. 8.  Intermediate risk general exercise treadmill test.         Cardiac catheterization-9/10/2021:   Coronary anatomy:  Dominance: Right  1. Left main: Angiographically unremarkable  2. LAD: Large vessel that supplies the entire apex. It gives rise to a large bifurcating first diagonal.  In its distal portion it gives rise to a very large bifurcating septal branch. The LAD has mild diffuse disease  3. Left circumflex:  This is a smaller vessel with a small OM. Has no significant disease angiographically  4. RCA: This is a large tortuous vessel with a large RPDA and large terminal RPL of multiple smaller RPL branches. It has minimal luminal irregularities     Hemodynamics:  LVEDP 15  Ao: 124/63 with a mean of 106      ASSESSMENT:  · Frequent ventricular ectopy including nonsustained VT with an abnormal stress test obstructive CAD was ruled out. 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 · Dyspnea with exertion multifactorial including diastolic dysfunction, tobacco use and COPD or chronic bronchitis  · Tobacco abuse  · Alcohol abuse  · Abnormal CT chest    Plan:   · Echocardiogram Results were reviewed and patient has mild LV dysfunction no evidence of MIs. · Initiated on guideline directed medical therapy. Continue Toprol-XL 25 mg p.o. daily. I would also recommend adding a low-dose lisinopril  2.5 mg p.o. daily. Check BMP in 1 week. · Needs aggressive risk factor modification and was strongly encouraged to quit tobacco use. · He was also advised to restrict alcohol use to less than 2 drinks a day. · Evaluation of COPD as per primary service  · He is stable for discharge from cardiology standpoint. Follow-up with cardiology service in 1 month. · We will sign off, please call us if you have any further questions or concerns. Elisa Ann MD., Gerald Watson.   [phone] Cardiology

## 2021-09-15 ENCOUNTER — TELEPHONE (OUTPATIENT)
Dept: CARDIOLOGY CLINIC | Age: 61
End: 2021-09-15

## 2021-10-27 ENCOUNTER — TELEPHONE (OUTPATIENT)
Dept: CARDIOLOGY CLINIC | Age: 61
End: 2021-10-27

## 2021-10-27 NOTE — TELEPHONE ENCOUNTER
Unable to reach office due to pt care. Pt's spouse Antony Duenas) called in stating pt seen PCP Monday. He had stressed concerns to PCP of SOB and fatigue, no chest pain. EKG was performed in office. Appt is sched for 11/19. Is there anyway an accomodation can be made to move up appt sooner? Please return call to spouse Hannah Doss to advise further. Thank you.

## 2021-11-05 ENCOUNTER — OFFICE VISIT (OUTPATIENT)
Dept: CARDIOLOGY CLINIC | Age: 61
End: 2021-11-05
Payer: COMMERCIAL

## 2021-11-05 VITALS
RESPIRATION RATE: 14 BRPM | HEIGHT: 72 IN | SYSTOLIC BLOOD PRESSURE: 130 MMHG | DIASTOLIC BLOOD PRESSURE: 78 MMHG | HEART RATE: 68 BPM | BODY MASS INDEX: 27.5 KG/M2 | WEIGHT: 203 LBS

## 2021-11-05 DIAGNOSIS — Z72.0 TOBACCO ABUSE: ICD-10-CM

## 2021-11-05 DIAGNOSIS — I50.22 CHRONIC SYSTOLIC (CONGESTIVE) HEART FAILURE (HCC): ICD-10-CM

## 2021-11-05 DIAGNOSIS — F10.10 ALCOHOL ABUSE: ICD-10-CM

## 2021-11-05 DIAGNOSIS — I49.3 FREQUENT PVCS: ICD-10-CM

## 2021-11-05 DIAGNOSIS — R06.02 SOB (SHORTNESS OF BREATH): ICD-10-CM

## 2021-11-05 DIAGNOSIS — I49.3 PVC (PREMATURE VENTRICULAR CONTRACTION): Primary | ICD-10-CM

## 2021-11-05 DIAGNOSIS — R00.2 PALPITATIONS: ICD-10-CM

## 2021-11-05 PROCEDURE — 3017F COLORECTAL CA SCREEN DOC REV: CPT | Performed by: INTERNAL MEDICINE

## 2021-11-05 PROCEDURE — 1036F TOBACCO NON-USER: CPT | Performed by: INTERNAL MEDICINE

## 2021-11-05 PROCEDURE — 93000 ELECTROCARDIOGRAM COMPLETE: CPT | Performed by: INTERNAL MEDICINE

## 2021-11-05 PROCEDURE — G8427 DOCREV CUR MEDS BY ELIG CLIN: HCPCS | Performed by: INTERNAL MEDICINE

## 2021-11-05 PROCEDURE — G8419 CALC BMI OUT NRM PARAM NOF/U: HCPCS | Performed by: INTERNAL MEDICINE

## 2021-11-05 PROCEDURE — 99214 OFFICE O/P EST MOD 30 MIN: CPT | Performed by: INTERNAL MEDICINE

## 2021-11-05 PROCEDURE — G8484 FLU IMMUNIZE NO ADMIN: HCPCS | Performed by: INTERNAL MEDICINE

## 2021-11-05 RX ORDER — METOPROLOL SUCCINATE 50 MG/1
25 TABLET, EXTENDED RELEASE ORAL DAILY
Qty: 90 TABLET | Refills: 3 | Status: SHIPPED
Start: 2021-11-05 | End: 2021-11-08 | Stop reason: ALTCHOICE

## 2021-11-05 NOTE — PROGRESS NOTES
Out Patient CARDIOLOGY FOLLOW UP    Name: Patito Feng    Age: 63 y.o. Date of Service: 11/5/2021      Referring Physician: No admitting provider for patient encounter. Chief Complaint   Patient presents with    Shortness of Breath    Chest Pain        History of Present Illness: 34-year-old male with history of tobacco abuse, alcohol abuse, anxiety, depression, and sedentary lifestyle who was hospitalized in September 2021 with nonspecific symptoms of fatigue, dyspnea on exertion and lightheadedness. He had a troponin leak and subsequently underwent pharmacologic myocardial perfusion stress test which was abnormal which led to invasive coronary angiogram.  His angiogram revealed no significant obstructive coronary disease. His echocardiogram during the hospitalization revealed EF of 45 to 50%. He presented for follow-up visit today and denies any angina or CHF symptoms but report has been having palpitations. He brought EKG from his primary care physician's office done on October 25, 2021 which revealed frequent PVCs/bigeminy's. I have subsequently placed the patient and ZIO Patch heart monitor for 14 days and he will follow-up for further evaluation and management. His metoprolol dose was also increased to 50 mg daily. Review of Systems:   Cardiac: As per HPI  General: Denies fever or chills  Pulmonary: As per HPI  HEENT: Denies runny nose  GI: No complaints  : No complaints  Endocrine: Denies night sweats  Musculoskeletal: No complaints  Skin: Dry skin  Neuro: No complaints  Psych: Denies depression    Past Medical History:  Past Medical History:   Diagnosis Date    Chronic systolic (congestive) heart failure ([facility] [address] 9/10/2021       Past Surgical History:  History reviewed. No pertinent surgical history. Family History:  History reviewed. No pertinent family history.     Social History:  Social History     Socioeconomic History    Marital status:      Spouse name: Not on file    Number of children: Not on file    Years of education: Not on file    Highest education level: Not on file   Occupational History    Not on file   Tobacco Use    Smoking status: Former Smoker     Packs/day: 1.50     Types: Cigarettes     Quit date: 2021     Years since quittin.1    Smokeless tobacco: Never Used   Vaping Use    Vaping Use: Every day    Substances: Flavoring   Substance and Sexual Activity    Alcohol use: Yes     Comment: 1 beer/day    Drug use: Never    Sexual activity: Not on file   Other Topics Concern    Not on file   Social History Narrative    Not on file     Social Determinants of Health     Financial Resource Strain:     Difficulty of Paying Living Expenses:    Food Insecurity:     Worried About Running Out of Food in the Last Year:     [address] in the Last Year:    Transportation Needs:     Lack of Transportation (Medical):  Lack of Transportation (Non-Medical):    Physical Activity:     Days of Exercise per Week:     Minutes of Exercise per Session:    Stress:     Feeling of Stress :    Social Connections:     Frequency of Communication with Friends and Family:     Frequency of Social Gatherings with Friends and Family:     Attends Buddhism Services:     Active Member of Clubs or Organizations:     Attends Club or Organization Meetings:     Marital Status:    Intimate Partner Violence:     Fear of Current or Ex-Partner:     Emotionally Abused:     Physically Abused:     Sexually Abused: Allergies:  No Known Allergies    Home Medications:  Prior to Admission medications    Medication Sig Start Date End Date Taking?  Authorizing Provider   metoprolol succinate (TOPROL XL) 50 MG extended release tablet Take 0.5 tablets by mouth daily 21  Yes Jose Palomo MD   aspirin 81 MG chewable tablet Take 1 tablet by mouth daily 21  Yes Ivet Horne MD   lisinopril (PRINIVIL;ZESTRIL) 2.5 MG tablet Take 1 tablet by mouth daily 9/11/21  Yes Val Madrid MD   citalopram (CELEXA) 40 MG tablet Take 1 tablet by mouth daily   Yes Historical Provider, MD   predniSONE (DELTASONE) 20 MG tablet Take 0.5 tablets by mouth daily  Patient not taking: Reported on 11/5/2021 9/11/21   Val Madrid MD   nicotine (NICODERM CQ) 14 MG/24HR Place 1 patch onto the skin daily  Patient not taking: Reported on 11/5/2021 9/12/21   Val Madrid MD   famotidine (PEPCID) 20 MG tablet Take 1 tablet by mouth daily  Patient not taking: Reported on 11/5/2021 9/12/21   Val Madrid MD       Current Medications:  Current Outpatient Medications   Medication Sig Dispense Refill    metoprolol succinate (TOPROL XL) 50 MG extended release tablet Take 0.5 tablets by mouth daily 90 tablet 3    aspirin 81 MG chewable tablet Take 1 tablet by mouth daily 30 tablet 3    lisinopril (PRINIVIL;ZESTRIL) 2.5 MG tablet Take 1 tablet by mouth daily 30 tablet 3    citalopram (CELEXA) 40 MG tablet Take 1 tablet by mouth daily      predniSONE (DELTASONE) 20 MG tablet Take 0.5 tablets by mouth daily (Patient not taking: Reported on 11/5/2021) 10 tablet 0    nicotine (NICODERM CQ) 14 MG/24HR Place 1 patch onto the skin daily (Patient not taking: Reported on 11/5/2021) 30 patch 3    famotidine (PEPCID) 20 MG tablet Take 1 tablet by mouth daily (Patient not taking: Reported on 11/5/2021) 60 tablet 3     No current facility-administered medications for this visit. Physical Exam:  /78   Pulse 68   Resp 14   Ht 6' (1.829 m)   Wt 203 lb (92.1 kg)   BMI 27.53 kg/m²   Wt Readings from Last 3 Encounters:   11/05/21 203 lb (92.1 kg)   09/08/21 193 lb (87.5 kg)       Appearance: Alert and oriented x3 not in acute distress.   Skin: Dry skin  Head: Atraumatic  Eyes: Intact extraocular muscles   ENMT: Mucous membranes are moist  Neck: Supple  Lungs: Clear to auscultation  Cardiac: Normal S1 and S2  Abdomen: Protuberant  Extremities: Intact range of motion  Neurologic: No focal neurological deficits  Peripheral Pulses: 2+ peripheral pulses    Intake/Output:  No intake or output data in the 24 hours ending 11/05/21 1811  [unfilled]    Laboratory Tests:  No results for input(s): NA, K, CL, CO2, BUN, CREATININE, GLUCOSE, CALCIUM in the last 72 hours. Lab Results   Component Value Date    MG 2.2 09/08/2021     No results for input(s): ALKPHOS, ALT, AST, PROT, BILITOT, BILIDIR, LABALBU in the last 72 hours. No results for input(s): WBC, RBC, HGB, HCT, MCV, MCH, MCHC, RDW, PLT, MPV in the last 72 hours. No results found for: CKTOTAL, CKMB, CKMBINDEX, TROPONINI  No results for input(s): CKTOTAL, CKMB, CKMBINDEX, TROPHS in the last 72 hours. Lab Results   Component Value Date    INR 1.0 09/09/2021    PROTIME 10.8 09/09/2021     Lab Results   Component Value Date    TSH 2.910 09/09/2021     Lab Results   Component Value Date    LABA1C 6.0 (H) 09/09/2021     No results found for: EAG  Lab Results   Component Value Date    CHOL 184 09/09/2021     Lab Results   Component Value Date    TRIG 171 (H) 09/09/2021     Lab Results   Component Value Date    HDL 63 09/09/2021     Lab Results   Component Value Date    LDLCALC 87 09/09/2021     Lab Results   Component Value Date    LABVLDL 34 09/09/2021     No results found for: CHOLHDLRATIO  No results for input(s): PROBNP in the last 72 hours. Cardiac Tests:  EKG reviewed (EKG date: Sinus rhythm 68 bpm):    Echocardiogram reviewed: 9/2021  Summary   Technically difficult examination. Stage I diastolic dysfunction. Ejection fraction is visually estimated at 45n to 50%. Cannot rule our LV segmental wall motion abnormalities. The right ventricle was not clearly visualized but grossly appears normal   in size and systolic function,    Stress test reviewed:  9/2021  1.  No stress-induced EKG changes at peak exercise.  However, frequent   ventricular ectopy in the form of multiple ventricular   triplets/nonsustained VT noted.   2.  The myocardial perfusion imaging was abnormal.   3.  The abnormality was a medium-sized, mild perfusion defect   involving the mid to distal inferior septum and anterior septum   suggestive of prior infarct with minimal homa-infarct ischemia. 4.  Overall left ventricular systolic function was normal with   regional wall motion abnormalities. 5.  No transient ischemic dilatation. 6.  [place] treadmill score was 3.5 implying intermediate risk.     7.  Exercise capacity was below average. 8.  Intermediate risk general exercise treadmill test.       Cardiac catheterization reviewed: 2/2021    Coronary anatomy:   Dominance: Right   1. Left main: Angiographically unremarkable   2. LAD: Large vessel that supplies the entire apex.  It gives   rise to a large bifurcating first diagonal.  In its distal   portion it gives rise to a very large bifurcating septal branch.     The LAD has mild diffuse disease   3. Left circumflex: This is a smaller vessel with a small OM.     Has no significant disease angiographically   4. RCA: This is a large tortuous vessel with a large RPDA and   large terminal RPL of multiple smaller RPL branches.  It has   minimal luminal irregularities   CXR reviewed: The 10-year ASCVD risk score (William Garcia., et al., 2013) is: 7.6%    Values used to calculate the score:      Age: 63 years      Sex: Male      Is Non- : No      Diabetic: No      Tobacco smoker: No      Systolic Blood Pressure: 508 mmHg      Is BP treated: No      HDL Cholesterol: 63 mg/dL      Total Cholesterol: 184 mg/dL    ASSESSMENT / PLAN:    1. PVC (premature ventricular contraction)  - Cardiac event monitor; Future  Beta-blocker was increased to 50 mg and patient will follow up in 3 months for close monitoring.     2. Chronic systolic (congestive) heart failure (HCC)  Currently in NYHA class I  Continue lisinopril, beta-blocker and we will attempt to add spironolactone to next visit if kidney function and blood pressure stable    3. SOB (shortness of breath)  Denies any symptoms. 4. Palpitations  - Cardiac event monitor; Future    5. Frequent PVCs  - Cardiac event monitor; Future    6. Tobacco abuse  Smoking cessation was highly recommended  7. Alcohol abuse  He was advised on the crucial need of avoiding alcohol abuse. FOLLOW-UP in 3 months      Thank you for allowing me to participate in your patient's care. Please feel free to contact me if you have any questions or concerns.     Bozena Yanez MD  [address] Cardiology

## 2021-11-08 RX ORDER — METOPROLOL SUCCINATE 50 MG/1
50 TABLET, EXTENDED RELEASE ORAL DAILY
Qty: 30 TABLET | Refills: 5 | Status: SHIPPED
Start: 2021-11-08 | End: 2022-02-07 | Stop reason: ALTCHOICE

## 2021-11-08 NOTE — TELEPHONE ENCOUNTER
Per pt's spouse, Valery Adry was to change dosage of Toprol to 50mg a day. The script pt picked up stated . 5 of a 50mg 1xday. Therefore there was no increase which was discussed during Appt 11/5. Please return call to pt to clarify further. Thank you.

## 2021-12-01 DIAGNOSIS — R42 DIZZINESS: ICD-10-CM

## 2021-12-01 DIAGNOSIS — I49.3 FREQUENT PVCS: ICD-10-CM

## 2021-12-01 DIAGNOSIS — R00.2 PALPITATIONS: ICD-10-CM

## 2021-12-01 DIAGNOSIS — I49.3 PVC (PREMATURE VENTRICULAR CONTRACTION): Primary | ICD-10-CM

## 2021-12-01 DIAGNOSIS — I49.3 PVC (PREMATURE VENTRICULAR CONTRACTION): ICD-10-CM

## 2021-12-06 ENCOUNTER — TELEPHONE (OUTPATIENT)
Dept: CARDIOLOGY CLINIC | Age: 61
End: 2021-12-06

## 2021-12-06 NOTE — TELEPHONE ENCOUNTER
Contacted patient with results and recommendations per Dr. Jyoti Cobian. He verbalized understanding.   Referral placed to [id number] [facility] EP.    ----- Message from Juan J Wright MD sent at 12/3/2021  9:49 PM EST -----  Heart monitor shows 16% PVC burden and I would like patient to see EP for their guidance and management

## 2022-02-04 ENCOUNTER — VIRTUAL VISIT (OUTPATIENT)
Dept: NON INVASIVE DIAGNOSTICS | Age: 62
End: 2022-02-04
Payer: COMMERCIAL

## 2022-02-04 DIAGNOSIS — R06.02 SHORTNESS OF BREATH: ICD-10-CM

## 2022-02-04 DIAGNOSIS — I49.3 PVC (PREMATURE VENTRICULAR CONTRACTION): Primary | ICD-10-CM

## 2022-02-04 DIAGNOSIS — I49.3 FREQUENT PVCS: ICD-10-CM

## 2022-02-04 DIAGNOSIS — R00.2 PALPITATIONS: ICD-10-CM

## 2022-02-04 PROCEDURE — G8484 FLU IMMUNIZE NO ADMIN: HCPCS | Performed by: INTERNAL MEDICINE

## 2022-02-04 PROCEDURE — G8419 CALC BMI OUT NRM PARAM NOF/U: HCPCS | Performed by: INTERNAL MEDICINE

## 2022-02-04 PROCEDURE — 1036F TOBACCO NON-USER: CPT | Performed by: INTERNAL MEDICINE

## 2022-02-04 PROCEDURE — 3017F COLORECTAL CA SCREEN DOC REV: CPT | Performed by: INTERNAL MEDICINE

## 2022-02-04 PROCEDURE — G8428 CUR MEDS NOT DOCUMENT: HCPCS | Performed by: INTERNAL MEDICINE

## 2022-02-04 PROCEDURE — 99204 OFFICE O/P NEW MOD 45 MIN: CPT | Performed by: INTERNAL MEDICINE

## 2022-02-04 NOTE — PROGRESS NOTES
2022    TELEHEALTH EVALUATION -- Audio/Visual (During VMQJZ-11 public health emergency)    HPI:    Lefty Hickman (:  1960) has requested an audio/video evaluation for the following concern(s): PVCs management    57year old male with hx of fatigue, dizziness who came to ED 21 after being sent in from PCPs office for abnormal EKG. Due to frequent PVCs, he had Stress test done which showed Ex time 3:31min, 86% MPHR, frequent PVCs during exercise, LVEF 58% with HK of mid-distal reina septum, inferoseptal walls. Cardiac Cath showed minimal CAD. TTE 9/10/21 shows LVEF 45-50%. 14 day event monitor showed 16%, 4917 VT runs, longest 11 beats  PVC pattern shows inferior axis, LBBB morphology, early transition V3  He was started on Lisinopril 2.5 QD, Toprol XL 50 mg QD, Celexa    22: He does not feel palpitations. He felt sob with exertion, fatigued. Review of Systems     General: No unusual weight gain, change in exercise tolerance  Skin: No rash or itching  EENT: No vision changes or nosebleeds  Cardiovascular: No orthopnea or paroxysmal nocturnal dyspnea, neg chest pain, palpitation  Respiratory: neg Cough  and Pos sob  Gastrointestinal: No hematemesis or recent changes in bowel habits  Genitourinary: No hematuria, urgency or frequency  Musculoskeletal: No muscular weakness or joint swelling   Neurologic / Psychiatric: No incoordination or convulsions  Allergic / Immunologic/ Lymphatic / Endocrine: No anemia or bleeding tendency      Prior to Visit Medications    Medication Sig Taking?  Authorizing Provider   metoprolol succinate (TOPROL XL) 50 MG extended release tablet Take 1 tablet by mouth daily  Roderick Palomo MD   aspirin 81 MG chewable tablet Take 1 tablet by mouth daily  Linda Galvez MD   lisinopril (PRINIVIL;ZESTRIL) 2.5 MG tablet Take 1 tablet by mouth daily  Linda Galvez MD   predniSONE (DELTASONE) 20 MG tablet Take 0.5 tablets by mouth daily  Patient not taking: Reported on 2021  Adán Johnston MD   nicotine (NICODERM CQ) 14 MG/24HR Place 1 patch onto the skin daily  Patient not taking: Reported on 2021  Adán Johnston MD   famotidine (PEPCID) 20 MG tablet Take 1 tablet by mouth daily  Patient not taking: Reported on 2021  Adán Johnston MD   citalopram (CELEXA) 40 MG tablet Take 1 tablet by mouth daily  Historical Provider, MD       Social History     Tobacco Use    Smoking status: Former Smoker     Packs/day: 1.50     Types: Cigarettes     Quit date: 2021     Years since quittin.4    Smokeless tobacco: Never Used   Vaping Use    Vaping Use: Every day    Substances: Flavoring   Substance Use Topics    Alcohol use: Yes     Comment: 1 beer/day    Drug use: Never        PHYSICAL EXAMINATION:   No physical exam due to virtual visit    Event Monitor 21  Patient had a min HR of 57 bpm, max HR of 245 bpm, and avg HR of 87  bpm. Predominant underlying rhythm was Sinus Rhythm. 3341  Ventricular Tachycardia runs occurred, the run with the fastest  interval lasting 5 beats with a max rate of 245 bpm, the longest  lasting 11 beats with an avg rate of 180 bpm. 230 Supraventricular  Tachycardia runs occurred, the run with the fastest interval lasting  6 beats with a max rate of 200 bpm, the longest lasting 16 beats  with an avg rate of 167 bpm. Isolated SVEs were rare (<1.0%), SVE  Couplets were rare (<1.0%), and SVE Triplets were rare (<1.0%). Isolated VEs were frequent (13.3%, [id number]), VE Couplets were  frequent (16.9%, [id number]), and VE Triplets were frequent (11.0%,  [id number]). Ventricular Bigeminy and Trigeminy were present. 9/10/21 Cath    Conclusions:  1. Minimal epicardial CAD  2. Borderline elevated left filling pressure    9/10/21 TTE        Summary   Technically difficult examination. Stage I diastolic dysfunction. Ejection fraction is visually estimated at45n to 50%. Cannot rule our LV segmental wall motion abnormalities.    The right ventricle was not clearly visualized but grossly appears normal   in size and systolic function,    6/4/89 Stress Test   No stress-induced EKG changes at peak exercise. However, frequent   ventricular ectopy in the form of multiple ventricular   triplets/nonsustained VT noted. 2.  The myocardial perfusion imaging was abnormal.   3.  The abnormality was a medium-sized, mild perfusion defect   involving the mid to distal inferior septum and anterior septum   suggestive of prior infarct with minimal homa-infarct ischemia. 4.  Overall left ventricular systolic function was normal with   regional wall motion abnormalities. 5.  No transient ischemic dilatation. ASSESSMENT/PLAN:    1. PVC (premature ventricular contraction)    2. Frequent PVCs    3. Palpitations    4. Shortness of breath      1. PVC  - Unifocal on ECG and appeared RVOT though cannot exclude LVOT  - More multifocal on event monitor with frequent runs, 4917 runs, longest 11 beats  - 16 % PVC burden on Zio patch   - On Toprol 50 mg QD  - Went over options of AAD vs RFA as he does have sob with arrhythmia. LVEF mildly reduced on TTE 45-50%, minimal CAD on Cath,  stress test with LVEF 58%  - Start Magnesium to help stabilize membrane  - He will think about AAD vs RFA and let us know his decision  - In the interim increase Toprol XL to 50 mg bid    Return in about 6 weeks (around 3/18/2022). Omer Chapman is a 57 y.o. male being evaluated by a Virtual Visit/phone/(video visit) encounter to address concerns as mentioned above. A caregiver was present when appropriate. Due to this being a TeleHealth encounter (During TPXVG-53 public health emergency), evaluation of the following organ systems was limited: Vitals/Constitutional/EENT/Resp/CV/GI//MS/Neuro/Skin/Heme-Lymph-Imm.   Pursuant to the emergency declaration under the [address] [place], [address] waiver authority and the [employer] Appropriations Act, this Virtual Visit was conducted with patient's (and/or legal guardian's) consent, to reduce the patient's risk of exposure to COVID-19 and provide necessary medical care. The patient (and/or legal guardian) has also been advised to contact this office for worsening conditions or problems, and seek emergency medical treatment and/or call 911 if deemed necessary. Services were provided through a phone/video synchronous discussion virtually to substitute for in-person clinic visit. Patient and provider were located at their individual homes. --Sim Garcia MD on 2/4/2022 at 12:07 PM    An electronic signature was used to authenticate this note.

## 2022-02-07 ENCOUNTER — TELEPHONE (OUTPATIENT)
Dept: NON INVASIVE DIAGNOSTICS | Age: 62
End: 2022-02-07

## 2022-02-07 RX ORDER — MAGNESIUM OXIDE 400 MG/1
400 TABLET ORAL DAILY
Qty: 30 TABLET | Refills: 2 | Status: SHIPPED
Start: 2022-02-07 | End: 2022-05-23

## 2022-02-07 RX ORDER — ROSUVASTATIN CALCIUM 10 MG/1
TABLET, COATED ORAL
COMMUNITY
Start: 2022-01-17

## 2022-02-07 RX ORDER — METOPROLOL SUCCINATE 50 MG/1
50 TABLET, EXTENDED RELEASE ORAL 2 TIMES DAILY
COMMUNITY
End: 2022-02-07 | Stop reason: SDUPTHER

## 2022-02-07 RX ORDER — METOPROLOL SUCCINATE 50 MG/1
50 TABLET, EXTENDED RELEASE ORAL 2 TIMES DAILY
Qty: 60 TABLET | Refills: 2 | Status: SHIPPED
Start: 2022-02-07 | End: 2022-08-11 | Stop reason: SDUPTHER

## 2022-02-07 RX ORDER — MAGNESIUM OXIDE 400 MG/1
400 TABLET ORAL DAILY
COMMUNITY
End: 2022-02-07 | Stop reason: SDUPTHER

## 2022-02-07 NOTE — TELEPHONE ENCOUNTER
----- Message from Liban Knox MD sent at 2/4/2022 12:05 PM EST -----  Pls escript Mag ox 400 mg qd  Increase toprol XL to 50 mg bid  He will let us know about ablation vs hospitalization for sotalol or tikosyn  thx

## 2022-05-23 RX ORDER — LANOLIN ALCOHOL/MO/W.PET/CERES
CREAM (GRAM) TOPICAL
Qty: 30 TABLET | Refills: 0 | Status: SHIPPED
Start: 2022-05-23 | End: 2022-06-22

## 2022-06-22 RX ORDER — MAGNESIUM OXIDE TAB 400 MG (241.3 MG ELEMENTAL MG) 400 (241.3 MG) MG
TAB ORAL
Qty: 90 TABLET | Refills: 1 | Status: SHIPPED
Start: 2022-06-22 | End: 2022-08-11 | Stop reason: SDUPTHER

## 2022-07-20 ENCOUNTER — TELEPHONE (OUTPATIENT)
Dept: NON INVASIVE DIAGNOSTICS | Age: 62
End: 2022-07-20

## 2022-07-20 NOTE — TELEPHONE ENCOUNTER
DO Pj Hager [address] [facility],     Please schedule at my next office day.  Can squeeze him in.   -Dulce Maria Doyle, DO

## 2022-07-27 NOTE — PROGRESS NOTES
[address] [facility]  CARDIAC ELECTROPHYSIOLOGY DEPARTMENT/DIVISION OF CARDIOLOGY  Outpatient Progress Report  PATIENT: Luis Miguel Duran  MEDICAL RECORD NUMBER: [id number]  DATE OF SERVICE:  2022  ATTENDING ELECTROPHYSIOLOGIST:  Emmanuel Lam D.O.  REFERRING PHYSICIAN: No ref. provider found and Cliff Prater DO  CHIEF COMPLAINT: PVC    HPI: Luis Miguel Duran is a 57 y.o. male with a history of PVCs, alcohol abuse, and cannabis use. He is managed by Dr. Godwin Jorgensen and Abilio with aspirin 81 mg daily, lisinopril 2.5 mg daily, metoprolol XL 50 mg twice daily, and Crestor 10 mg daily. .  In , patient was noted to have abnormal stress test.  He underwent coronary angiogram which reported no significant CAD. A TTE around that time noted 45-50% LVEF. He was also observed to have frequent PVCs. A cardiac event monitor in 2021 reported PVC burden of approximately 42% with multiple episodes of NSVT. Patient presents today, 2022; for evaluation with my office as his established EP is currently unavailable. Prior cardiac testin day event monitor (21): SR at  bpm (mean: 86 bpm), SVE burden less than 1%, 230 episodes of asymptomatic and nonsustained SVT (longest: 16 beats at 167 bpm; fastest 200 bpm for 6 beats), VE burden approximately 42%, approximately 5000 episodes of asymptomatic and nonsustained VT (longest: 11 beats at 180 bpm; fastest: 245 bpm for 5 beats), no patient events, no AF, second-degree type II or third-degree AV block, or significant pauses. LHC (9/10/2021): RCA dominant system, no significant CAD. TTE (9/10/2021): LVEF = 45-50%, stage I LVDD. Exercise nuclear stress test (2021): No ischemic ECG changes at 86% of APM HR, frequent PVCs during exercise, infarct of mid-distal inferior septum and anterior septum with minimal homa-infarct ischemia, no transient ischemic dilation, below average exercise capacity for age/sex.     Past Medical History:   Diagnosis Date    Chronic systolic (congestive) heart failure ([facility] [address] 9/10/2021   No past surgical history on file. No family history on file. There is no family history of sudden cardiac arrest    Social History     Tobacco Use    Smoking status: Former     Packs/day: 1.50     Years: 40.00     Pack years: 60.00     Types: Cigarettes     Quit date: 2022     Years since quittin.1    Smokeless tobacco: Never   Substance Use Topics    Alcohol use: Yes     Comment: 1 beer/day       Current Outpatient Medications   Medication Sig Dispense Refill    tiotropium (SPIRIVA) 18 MCG inhalation capsule Inhale 18 mcg into the lungs in the morning. MAGNESIUM-OXIDE 400 (240 Mg) MG tablet TAKE 1 TABLET BY MOUTH DAILY 90 tablet 1    metoprolol succinate (TOPROL XL) 50 MG extended release tablet Take 1 tablet by mouth 2 times daily 60 tablet 2    rosuvastatin (CRESTOR) 10 MG tablet TAKE 1 TABLET BY MOUTH EVERY DAY      aspirin 81 MG chewable tablet Take 1 tablet by mouth daily 30 tablet 3    lisinopril (PRINIVIL;ZESTRIL) 2.5 MG tablet Take 1 tablet by mouth daily 30 tablet 3    citalopram (CELEXA) 40 MG tablet Take 1 tablet by mouth daily      predniSONE (DELTASONE) 20 MG tablet Take 0.5 tablets by mouth daily (Patient not taking: No sig reported) 10 tablet 0    nicotine (NICODERM CQ) 14 MG/24HR Place 1 patch onto the skin daily (Patient not taking: Reported on 2022) 30 patch 3    famotidine (PEPCID) 20 MG tablet Take 1 tablet by mouth daily (Patient not taking: Reported on 2022) 60 tablet 3     No current facility-administered medications for this visit. No Known Allergies    ROS:   Constitutional: Negative for fever, activity change and appetite change. HENT: Negative for epistaxis. Eyes: Negative for diploplia, blurred vision. Respiratory: Negative for cough, chest tightness, shortness of breath and wheezing.    Cardiovascular: pertinent positives in HPI  Gastrointestinal: Negative for abdominal pain and blood in stool. Genitourinary: Negative for hematuria and difficulty urinating. Musculoskeletal: Negative for myalgias and gait problem. Skin: Negative for color change and rash. Neurological: Negative for syncope and light-headedness. Psychiatric/Behavioral: Negative for confusion and agitation. The patient is not nervous/anxious. Heme: no bleeding disorders, no melena or hematochezia  All other review of systems are negative     PHYSICAL EXAM:  /80   Pulse 84   Resp 18   Ht 6' (1.829 m)   Wt 205 lb 9.6 oz (93.3 kg)   BMI 27.88 kg/m²    Constitutional: Well-developed, no acute distress, well groomed  Eyes: conjunctivae normal, no xanthelasma   Ears, Nose, Throat: oral mucosa moist, no cyanosis   Neck: supple, no JVD, no thyromegaly   CV: normal rate, regular rhythm,  no murmurs, rubs, or gallops. PMI is nondisplaced, Peripheral pulses normal including carotid auscultation, no noted aortic bruit, bilateral femoral and pedal pulses are normal in quality  Lungs: clear to auscultation bilaterally, normal respiratory effort without used of accessory muscles, no wheezes  Abdomen: soft, non-tender, bowel sounds present, no masses or hepatomegaly   Extremities: no digital clubbing, no edema   Skin: warm, no rashes   Neuro/Psych: A&O x 3, normal mood and affect    Cardiac testing done today:   EC22: SR 84 bpm, PVCs (likely anterior RVOT)     Assessment/plan:  1. PVCs  - ECG today with likely anterior RVOT location.  - TTE in 2021 reported LVEF of 45-50%. He was reportedly abusing alcohol around the time. No assessment of LVEF since that time. - Event monitor in 2021 reported VE burden of approximately 42% with frequent NSVT episodes. These were asymptomatic.  - Recommend avoid alcohol.  - Continue metoprolol XL 50 mg twice daily.    -Recommend 3-lead 72-hour Holter monitor to further assess VE.  - Recommend TTE to reassess LVEF.   Consider cardiac MRI in future depending on results of 3-lead monitor.  -Follow-up with my office in 3 months. I spent a total of 40 minutes reviewing previous notes, test results, and face to face with the patient discussing the diagnosis and importance of compliance with the treatment plan as well as documenting on the day of the visit. Time of the day of service includes:  Preparing to see the patient (eg. Review of the medical record, such as tests). Obtaining and/or reviewing separately obtained history. Ordering prescription medications, tests, and/or procedures. Communicating results to the patient/family/caregiver. Counseling/educating the patient/family/caregiver. Documenting clinical information in the patients electronic record. Coordination of care for the patient. Performing a medical appropriate exam and/or evaluation. Thank you for allowing me to participate in your patient's care. Please call me if there are any questions. Marie Herrmann D.O.   Cardiac Electrophysiology  [facility]  [facility]) Physicians    CC: Callie Cooley DO

## 2022-07-28 ENCOUNTER — OFFICE VISIT (OUTPATIENT)
Dept: NON INVASIVE DIAGNOSTICS | Age: 62
End: 2022-07-28
Payer: COMMERCIAL

## 2022-07-28 VITALS
RESPIRATION RATE: 18 BRPM | HEART RATE: 84 BPM | HEIGHT: 72 IN | DIASTOLIC BLOOD PRESSURE: 80 MMHG | WEIGHT: 205.6 LBS | BODY MASS INDEX: 27.85 KG/M2 | SYSTOLIC BLOOD PRESSURE: 124 MMHG

## 2022-07-28 DIAGNOSIS — I49.3 PVC (PREMATURE VENTRICULAR CONTRACTION): Primary | ICD-10-CM

## 2022-07-28 PROCEDURE — 99215 OFFICE O/P EST HI 40 MIN: CPT | Performed by: STUDENT IN AN ORGANIZED HEALTH CARE EDUCATION/TRAINING PROGRAM

## 2022-07-28 PROCEDURE — 93000 ELECTROCARDIOGRAM COMPLETE: CPT | Performed by: STUDENT IN AN ORGANIZED HEALTH CARE EDUCATION/TRAINING PROGRAM

## 2022-07-28 NOTE — PATIENT INSTRUCTIONS
No medication changes at this time. Complete cardiac monitor 3-day. You will be contacted to schedule echocardiogram.  Follow-up with this office in 3 months.

## 2022-08-11 RX ORDER — LANOLIN ALCOHOL/MO/W.PET/CERES
CREAM (GRAM) TOPICAL
Qty: 90 TABLET | Refills: 3 | Status: SHIPPED | OUTPATIENT
Start: 2022-08-11

## 2022-08-11 RX ORDER — METOPROLOL SUCCINATE 50 MG/1
50 TABLET, EXTENDED RELEASE ORAL 2 TIMES DAILY
Qty: 180 TABLET | Refills: 3 | Status: SHIPPED | OUTPATIENT
Start: 2022-08-11

## 2022-08-22 DIAGNOSIS — I49.3 PVC (PREMATURE VENTRICULAR CONTRACTION): ICD-10-CM

## 2022-09-14 ENCOUNTER — TELEPHONE (OUTPATIENT)
Dept: CARDIOLOGY | Age: 62
End: 2022-09-14

## 2022-09-15 ENCOUNTER — HOSPITAL ENCOUNTER (OUTPATIENT)
Dept: CARDIOLOGY | Age: 62
Discharge: HOME | End: 2022-09-15
Payer: COMMERCIAL

## 2022-09-15 DIAGNOSIS — I49.3 PVC (PREMATURE VENTRICULAR CONTRACTION): ICD-10-CM

## 2022-09-15 PROCEDURE — 93308 TTE F-UP OR LMTD: CPT

## 2022-09-16 ENCOUNTER — TELEPHONE (OUTPATIENT)
Dept: NON INVASIVE DIAGNOSTICS | Age: 62
End: 2022-09-16

## 2022-09-16 NOTE — TELEPHONE ENCOUNTER
----- Message from Gaviota Neely DO sent at 9/16/2022  9:22 AM EDT -----  Hello,    I believe I saw this patient in 7/2022. At that time I ordered event monitor and TTE. Event monitor reported high PVC burden of predominantly single morphology. TTE has not yet been performed. Please schedule TTE and appointment with me 1 week after TTE. Need to discuss indications, material risks, and benefits of management options.    -Gaviota Neely DO   ----- Message -----  From: John Marin  Sent: 7/19/2022   9:26 AM EDT  To: Gaviota Neely DO    [facility] pt, called in stating he would like to schedule an ablation. Please advise if you would like to see patient, and if he can be squeezed in.  Thank you

## 2022-09-22 ENCOUNTER — TELEPHONE (OUTPATIENT)
Dept: NON INVASIVE DIAGNOSTICS | Age: 62
End: 2022-09-22

## 2022-09-22 NOTE — TELEPHONE ENCOUNTER
----- Message from Ingrid Rush DO sent at 9/21/2022  4:30 PM EDT -----  Regarding: Appointment  Please squeeze patient into office schedule in near future for discussion of PVC ablation as he has predominantly single morphology PVC with high burden and LVEF that is less than 50%.   Ingrid Rush DO

## 2022-10-04 NOTE — PROGRESS NOTES
[address] [facility]  CARDIAC ELECTROPHYSIOLOGY DEPARTMENT/DIVISION OF CARDIOLOGY  Outpatient Progress Report  PATIENT: Jorden Nicole  MEDICAL RECORD NUMBER: [id number]  DATE OF SERVICE:  10/5/2022  ATTENDING ELECTROPHYSIOLOGIST:  Donnia Cowden, D.O.  REFERRING PHYSICIAN: No ref. provider found and Viky Pearce DO  CHIEF COMPLAINT: PVC    HPI: Jorden Nicole is a 57 y.o. male with a history of PVCs, NYHA class I HFpEF-borderline 2/2 PVC, alcohol abuse (resolved), and cannabis use (resolved). He is managed by Dr. Willem Chen with lisinopril 2.5 mg daily, metoprolol XL 50 mg BID, and Crestor 10 mg daily. In 2021, he was diagnosed with NICM (LVEF = 45-50%) 2/2 PVC, which was treated with GDMT. In 2021, a 2 week event monitor reproted VE burden of 42% and frequent NSVT. He stopped EtOH/durg use. In 2022, he was referred to my office and reported cessation of EtOH/drug use. ECG consistent with anterior RVOT PASCALE. A 72 hour 3 lead monitor reported VE burden of 43% of predominantly single morphology and a TTE reported persistent LVEF of 45-50%. He presents today, 10/5/2022; for follow-up with my office. He complains of persistent fatigue.      Prior cardiac testing:   Limited TTE: 9/15/2022: LVEF 45-50%, mild MR, trace TR, LVH 1.5cm  72 hour event monitor (22): SR at 65 - 110 bpm (mean: 101 bpm),1 patient event during SR with runs of VE, SVE burden = 0%, VE burden = 42.95% (5 morphologies, 98.08% single morphology), 12,021 runs of VE (longest: 7 beats; fastest: 248 bpm), no AF, SVT, 2* type II or 3* AV block, or significant pauses  EC22: SR 84 bpm, PVCs (likely anterior RVOT)  14 day event monitor (21): SR at  bpm (mean: 86 bpm), SVE burden less than 1%, 230 episodes of asymptomatic and nonsustained SVT (longest: 16 beats at 167 bpm; fastest 200 bpm for 6 beats), VE burden approximately 42%, approximately 5000 episodes of asymptomatic and nonsustained VT (longest: 11 beats at 180 bpm; fastest: 245 bpm for 5 beats), no patient events, no AF, second-degree type II or third-degree AV block, or significant pauses. LHC (9/10/2021): RCA dominant system, no significant CAD. TTE (9/10/2021): LVEF = 45-50%, stage I LVDD. Exercise nuclear stress test (2021): No ischemic ECG changes at 86% of APM HR, frequent PVCs during exercise, infarct of mid-distal inferior septum and anterior septum with minimal homa-infarct ischemia, no transient ischemic dilation, below average exercise capacity for age/sex. Past Medical History:   Diagnosis Date    Chronic systolic (congestive) heart failure ([facility] [address] 9/10/2021   History reviewed. No pertinent surgical history. History reviewed. No pertinent family history. There is no family history of sudden cardiac arrest    Social History     Tobacco Use    Smoking status: Former     Packs/day: 1.50     Years: 40.00     Pack years: 60.00     Types: Cigarettes     Quit date: 2022     Years since quittin.3    Smokeless tobacco: Never   Substance Use Topics    Alcohol use: Yes     Comment: 1 beer/day       Current Outpatient Medications   Medication Sig Dispense Refill    albuterol sulfate HFA (PROVENTIL;VENTOLIN;PROAIR) 108 (90 Base) MCG/ACT inhaler 2 puffs      MAGnesium-Oxide 400 (240 Mg) MG tablet TAKE 1 TABLET BY MOUTH DAILY 90 tablet 3    metoprolol succinate (TOPROL XL) 50 MG extended release tablet Take 1 tablet by mouth in the morning and 1 tablet before bedtime. 180 tablet 3    tiotropium (SPIRIVA) 18 MCG inhalation capsule Inhale 18 mcg into the lungs in the morning. rosuvastatin (CRESTOR) 10 MG tablet TAKE 1 TABLET BY MOUTH EVERY DAY      lisinopril (PRINIVIL;ZESTRIL) 2.5 MG tablet Take 1 tablet by mouth daily 30 tablet 3    citalopram (CELEXA) 40 MG tablet Take 1 tablet by mouth daily       No current facility-administered medications for this visit.         No Known Allergies    ROS:   Constitutional: Negative for fever, activity change and appetite change. HENT: Negative for epistaxis. Eyes: Negative for diploplia, blurred vision. Respiratory: Negative for cough, chest tightness, shortness of breath and wheezing. Cardiovascular: pertinent positives in HPI  Gastrointestinal: Negative for abdominal pain and blood in stool. Genitourinary: Negative for hematuria and difficulty urinating. Musculoskeletal: Negative for myalgias and gait problem. Skin: Negative for color change and rash. Neurological: Negative for syncope and light-headedness. Psychiatric/Behavioral: Negative for confusion and agitation. The patient is not nervous/anxious. Heme: no bleeding disorders, no melena or hematochezia  All other review of systems are negative     PHYSICAL EXAM:  /82   Pulse 79   Resp 18   Ht 6' (1.829 m)   Wt 205 lb 9.6 oz (93.3 kg)   BMI 27.88 kg/m²     Constitutional: Well-developed, no acute distress, well groomed  Eyes: conjunctivae normal, no xanthelasma   Ears, Nose, Throat: oral mucosa moist, no cyanosis   Neck: supple, no JVD, no thyromegaly   CV: normal rate, regular rhythm,  no murmurs, rubs, or gallops. PMI is nondisplaced, Peripheral pulses normal including carotid auscultation, no noted aortic bruit, bilateral femoral and pedal pulses are normal in quality  Lungs: clear to auscultation bilaterally, normal respiratory effort without used of accessory muscles, no wheezes  Abdomen: soft, non-tender, bowel sounds present, no masses or hepatomegaly   Extremities: no digital clubbing, no edema   Skin: warm, no rashes   Neuro/Psych: A&O x 3, normal mood and affect    Cardiac testing done today:   ECG:10/5/22: SR at 68 bpm, Qtc = 404 ms     Assessment/plan:  1. PVCs  - ECG: PASCALE likely anterior RVOT location.  - TTE: persistent LVEF of 45-50% despite GDMT and EtOH/drug use cessation. -- Event monitor: VE burden = 43% of predominantly single morphology.   -- I reviewed indications, material risks, and benefits of options (AA v ablation). He desires ablation. Recommend hold metoprolol XL 50 mg BID starting 5 days prior to ablation procedure.  -Follow-up with my office in 1 month after ablation. 2. NYHA Class I HFpEF-borderline/nonischemic 2/2 PVC  -- Management per Dr John Sinclair. -- Plan for TTE 3 months after ablation to reassess LVEF. I spent a total of 40 minutes reviewing previous notes, test results, and face to face with the patient discussing the diagnosis and importance of compliance with the treatment plan as well as documenting on the day of the visit. Time of the day of service includes:  Preparing to see the patient (eg. Review of the medical record, such as tests). Obtaining and/or reviewing separately obtained history. Ordering prescription medications, tests, and/or procedures. Communicating results to the patient/family/caregiver. Counseling/educating the patient/family/caregiver. Documenting clinical information in the patients electronic record. Coordination of care for the patient. Performing a medical appropriate exam and/or evaluation. Thank you for allowing me to participate in your patient's care. Please call me if there are any questions. Lawerence Schirmer, D.O.   Cardiac Electrophysiology  [facility]  [phone]  Physicians    CC: Jorge Davis DO

## 2022-10-05 ENCOUNTER — OFFICE VISIT (OUTPATIENT)
Dept: NON INVASIVE DIAGNOSTICS | Age: 62
End: 2022-10-05
Payer: COMMERCIAL

## 2022-10-05 VITALS
DIASTOLIC BLOOD PRESSURE: 82 MMHG | SYSTOLIC BLOOD PRESSURE: 130 MMHG | WEIGHT: 205.6 LBS | RESPIRATION RATE: 18 BRPM | HEIGHT: 72 IN | BODY MASS INDEX: 27.85 KG/M2 | HEART RATE: 79 BPM

## 2022-10-05 DIAGNOSIS — I48.0 PAROXYSMAL ATRIAL FIBRILLATION (HCC): Primary | ICD-10-CM

## 2022-10-05 PROCEDURE — G8427 DOCREV CUR MEDS BY ELIG CLIN: HCPCS | Performed by: STUDENT IN AN ORGANIZED HEALTH CARE EDUCATION/TRAINING PROGRAM

## 2022-10-05 PROCEDURE — G8484 FLU IMMUNIZE NO ADMIN: HCPCS | Performed by: STUDENT IN AN ORGANIZED HEALTH CARE EDUCATION/TRAINING PROGRAM

## 2022-10-05 PROCEDURE — 1036F TOBACCO NON-USER: CPT | Performed by: STUDENT IN AN ORGANIZED HEALTH CARE EDUCATION/TRAINING PROGRAM

## 2022-10-05 PROCEDURE — 3017F COLORECTAL CA SCREEN DOC REV: CPT | Performed by: STUDENT IN AN ORGANIZED HEALTH CARE EDUCATION/TRAINING PROGRAM

## 2022-10-05 PROCEDURE — 93000 ELECTROCARDIOGRAM COMPLETE: CPT | Performed by: STUDENT IN AN ORGANIZED HEALTH CARE EDUCATION/TRAINING PROGRAM

## 2022-10-05 PROCEDURE — G8419 CALC BMI OUT NRM PARAM NOF/U: HCPCS | Performed by: STUDENT IN AN ORGANIZED HEALTH CARE EDUCATION/TRAINING PROGRAM

## 2022-10-05 PROCEDURE — 99215 OFFICE O/P EST HI 40 MIN: CPT | Performed by: STUDENT IN AN ORGANIZED HEALTH CARE EDUCATION/TRAINING PROGRAM

## 2022-10-05 RX ORDER — ALBUTEROL SULFATE 90 UG/1
AEROSOL, METERED RESPIRATORY (INHALATION)
COMMUNITY
Start: 2022-02-28

## 2022-10-05 NOTE — PATIENT INSTRUCTIONS
No medication changes at this time. You will be scheduled for blood test and possibly brief appointment prior to ablation procedure. You will be contacted to schedule PVC ablation. Do NOT take metoprolol for 5 days before ablation procedure. Continue to avoid alcohol and drug use. Follow-up with this office 1 week after procedure for ECG. Follow-up with Dr Roxanna Jett office 1 month after ablation procedure.

## 2022-11-02 NOTE — PROGRESS NOTES
[address] [facility]  CARDIAC ELECTROPHYSIOLOGY DEPARTMENT/DIVISION OF CARDIOLOGY  Outpatient Progress Report  PATIENT: Jhon Apley  MEDICAL RECORD NUMBER: [id number]  DATE OF SERVICE:  11/3/2022  ATTENDING ELECTROPHYSIOLOGIST:  Darryle Risk, D.O.  REFERRING PHYSICIAN: No ref. provider sharita and Jessica Layne DO  CHIEF COMPLAINT: PVC    HPI: Jhon Apley is a 57 y.o. male with a history of PVCs, NYHA class I HFpEF-borderline 2/2 PVC, alcohol abuse (resolved), and cannabis use (resolved). He is managed by Dr. Gwyn Mckinley with albuterol, Celexa, Lisinopril 2.5mg daily, magOx 400mg, Toprol XL 50mg daily (currently on hold for planned PVC ablation) Crestor, Spiriva. In 2021, he was diagnosed with NICM (LVEF = 45-50%) 2/2 PVC, which was treated with GDMT. In 2021, a 2 week event monitor reproted VE burden of 42% and frequent NSVT. He stopped EtOH/durg use. In 2022, he was referred to my office and reported cessation of EtOH/drug use. ECG consistent with anterior RVOT PASCALE. A 72 hour 3 lead monitor reported VE burden of 43% of predominantly single morphology and a TTE reported persistent LVEF of 45-50%. He presents today, 11/3/2022; for follow-up with my office. He is to undergo PVC ablation on 2022. And notes ongoing dyspnea on exertion that is stable, all question answered regarding upcoming procedure.      Prior cardiac testing:   Limited TTE: 9/15/2022: LVEF 45-50%, mild MR, trace TR, LVH 1.5cm  72 hour event monitor (22): SR at 65 - 110 bpm (mean: 101 bpm),1 patient event during SR with runs of VE, SVE burden = 0%, VE burden = 42.95% (5 morphologies, 98.08% single morphology), 12,021 runs of VE (longest: 7 beats; fastest: 248 bpm), no AF, SVT, 2* type II or 3* AV block, or significant pauses  EC22: SR 84 bpm, PVCs (likely anterior RVOT)  14 day event monitor (21): SR at  bpm (mean: 86 bpm), SVE burden less than 1%, 230 episodes of asymptomatic and nonsustained SVT (longest: 16 beats at 167 bpm; fastest 200 bpm for 6 beats), VE burden approximately 42%, approximately 5000 episodes of asymptomatic and nonsustained VT (longest: 11 beats at 180 bpm; fastest: 245 bpm for 5 beats), no patient events, no AF, second-degree type II or third-degree AV block, or significant pauses. LHC (9/10/2021): RCA dominant system, no significant CAD. TTE (9/10/2021): LVEF = 45-50%, stage I LVDD. Exercise nuclear stress test (2021): No ischemic ECG changes at 86% of APM HR, frequent PVCs during exercise, infarct of mid-distal inferior septum and anterior septum with minimal homa-infarct ischemia, no transient ischemic dilation, below average exercise capacity for age/sex. Past Medical History:   Diagnosis Date    Chronic systolic (congestive) heart failure ([facility] [address] 9/10/2021   No past surgical history on file. No family history on file. There is no family history of sudden cardiac arrest    Social History     Tobacco Use    Smoking status: Former     Packs/day: 1.50     Years: 40.00     Pack years: 60.00     Types: Cigarettes     Quit date: 2022     Years since quittin.4    Smokeless tobacco: Never   Substance Use Topics    Alcohol use: Yes     Comment: 1 beer/day       Current Outpatient Medications   Medication Sig Dispense Refill    albuterol sulfate HFA (PROVENTIL;VENTOLIN;PROAIR) 108 (90 Base) MCG/ACT inhaler 2 puffs      MAGnesium-Oxide 400 (240 Mg) MG tablet TAKE 1 TABLET BY MOUTH DAILY 90 tablet 3    tiotropium (SPIRIVA) 18 MCG inhalation capsule Inhale 18 mcg into the lungs in the morning. rosuvastatin (CRESTOR) 10 MG tablet TAKE 1 TABLET BY MOUTH EVERY DAY      lisinopril (PRINIVIL;ZESTRIL) 2.5 MG tablet Take 1 tablet by mouth daily 30 tablet 3    citalopram (CELEXA) 40 MG tablet Take 1 tablet by mouth daily      metoprolol succinate (TOPROL XL) 50 MG extended release tablet Take 1 tablet by mouth in the morning and 1 tablet before bedtime.  (Patient not taking: Reported on 11/3/2022) 180 tablet 3     No current facility-administered medications for this visit. No Known Allergies    ROS:   Constitutional: Negative for fever, activity change and appetite change. HENT: Negative for epistaxis. Eyes: Negative for diploplia, blurred vision. Respiratory: Negative for cough, chest tightness, shortness of breath and wheezing. Cardiovascular: pertinent positives in HPI  Gastrointestinal: Negative for abdominal pain and blood in stool. Genitourinary: Negative for hematuria and difficulty urinating. Musculoskeletal: Negative for myalgias and gait problem. Skin: Negative for color change and rash. Neurological: Negative for syncope and light-headedness. Psychiatric/Behavioral: Negative for confusion and agitation. The patient is not nervous/anxious. Heme: no bleeding disorders, no melena or hematochezia  All other review of systems are negative     PHYSICAL EXAM:  /80   Pulse 94   Resp 18   Ht 6' (1.829 m)   Wt 202 lb (91.6 kg)   BMI 27.40 kg/m²     Constitutional: Well-developed, no acute distress, well groomed  Eyes: conjunctivae normal, no xanthelasma   Ears, Nose, Throat: oral mucosa moist, no cyanosis   Neck: supple, no JVD, no thyromegaly   CV: normal rate, regular rhythm,  no murmurs, rubs, or gallops. PMI is nondisplaced, Peripheral pulses normal including carotid auscultation, no noted aortic bruit, bilateral femoral and pedal pulses are normal in quality  Lungs: clear to auscultation bilaterally, normal respiratory effort without used of accessory muscles, no wheezes  Abdomen: soft, non-tender, bowel sounds present, no masses or hepatomegaly   Extremities: no digital clubbing, no edema   Skin: warm, no rashes   Neuro/Psych: A&O x 3, normal mood and affect    Cardiac testing done today:   EC/3/22: NSR with PVCs with couplets  rate 94 bpm, IA 150ms, , QTc 433ms      Assessment/plan:  1.   PVCs  - ECG: Likely PASCALE anterior RVOT location.  - TTE: persistent LVEF of 45-50% despite GDMT and EtOH/drug use cessation. -- Event monitor: VE burden = 43% of predominantly single morphology. -- I reviewed indications, material risks, and benefits of options (AA v ablation). He desires ablation. Recommend hold metoprolol XL 50 mg BID starting 5 days prior to ablation procedure.  -Follow-up  in 1 month after ablation. 2. NYHA Class I HFpEF-borderline/nonischemic 2/2 PVC  -- Management per Dr Tamiko Krueger. -- Plan for TTE 3 months after ablation to reassess LVEF. I spent a total of 30 minutes reviewing previous notes, test results, and face to face with the patient discussing the diagnosis and importance of compliance with the treatment plan as well as documenting on the day of the visit. Time of the day of service includes:  Preparing to see the patient (eg. Review of the medical record, such as tests). Obtaining and/or reviewing separately obtained history. Ordering prescription medications, tests, and/or procedures. Communicating results to the patient/family/caregiver. Counseling/educating the patient/family/caregiver. Documenting clinical information in the patients electronic record. Coordination of care for the patient. Performing a medical appropriate exam and/or evaluation. Thank you for allowing me to participate in your patient's care. Please call me if there are any questions.       Electronically signed by KAPIL Ballesteros CNP on 11/3/2022 at [address] [facility]  [address] [place] [address]

## 2022-11-03 ENCOUNTER — OFFICE VISIT (OUTPATIENT)
Dept: NON INVASIVE DIAGNOSTICS | Age: 62
End: 2022-11-03
Payer: COMMERCIAL

## 2022-11-03 VITALS
RESPIRATION RATE: 18 BRPM | SYSTOLIC BLOOD PRESSURE: 132 MMHG | WEIGHT: 202 LBS | HEIGHT: 72 IN | DIASTOLIC BLOOD PRESSURE: 80 MMHG | HEART RATE: 94 BPM | BODY MASS INDEX: 27.36 KG/M2

## 2022-11-03 DIAGNOSIS — I49.3 PVC (PREMATURE VENTRICULAR CONTRACTION): Primary | ICD-10-CM

## 2022-11-03 PROCEDURE — G8419 CALC BMI OUT NRM PARAM NOF/U: HCPCS | Performed by: NURSE PRACTITIONER

## 2022-11-03 PROCEDURE — 3017F COLORECTAL CA SCREEN DOC REV: CPT | Performed by: NURSE PRACTITIONER

## 2022-11-03 PROCEDURE — G8427 DOCREV CUR MEDS BY ELIG CLIN: HCPCS | Performed by: NURSE PRACTITIONER

## 2022-11-03 PROCEDURE — 93000 ELECTROCARDIOGRAM COMPLETE: CPT | Performed by: STUDENT IN AN ORGANIZED HEALTH CARE EDUCATION/TRAINING PROGRAM

## 2022-11-03 PROCEDURE — G8484 FLU IMMUNIZE NO ADMIN: HCPCS | Performed by: NURSE PRACTITIONER

## 2022-11-03 PROCEDURE — 1036F TOBACCO NON-USER: CPT | Performed by: NURSE PRACTITIONER

## 2022-11-03 PROCEDURE — 99214 OFFICE O/P EST MOD 30 MIN: CPT | Performed by: NURSE PRACTITIONER

## 2022-11-07 ENCOUNTER — HOSPITAL ENCOUNTER (OUTPATIENT)
Dept: CARDIAC CATH/INVASIVE PROCEDURES | Age: 62
Discharge: HOME | End: 2022-11-07

## 2022-11-15 ENCOUNTER — ANESTHESIA EVENT (OUTPATIENT)
Dept: CARDIAC CATH/INVASIVE PROCEDURES | Age: 62
End: 2022-11-15

## 2022-11-15 ENCOUNTER — TELEPHONE (OUTPATIENT)
Dept: NON INVASIVE DIAGNOSTICS | Age: 62
End: 2022-11-15

## 2022-11-15 ENCOUNTER — HOSPITAL ENCOUNTER (OUTPATIENT)
Dept: CARDIAC CATH/INVASIVE PROCEDURES | Age: 62
Discharge: HOME | End: 2022-11-15
Attending: STUDENT IN AN ORGANIZED HEALTH CARE EDUCATION/TRAINING PROGRAM | Admitting: STUDENT IN AN ORGANIZED HEALTH CARE EDUCATION/TRAINING PROGRAM
Payer: COMMERCIAL

## 2022-11-15 ENCOUNTER — ANESTHESIA (OUTPATIENT)
Dept: CARDIAC CATH/INVASIVE PROCEDURES | Age: 62
End: 2022-11-15

## 2022-11-15 VITALS
SYSTOLIC BLOOD PRESSURE: 136 MMHG | HEART RATE: 83 BPM | OXYGEN SATURATION: 96 % | DIASTOLIC BLOOD PRESSURE: 76 MMHG | HEIGHT: 72 IN | TEMPERATURE: 97 F | BODY MASS INDEX: 27.09 KG/M2 | WEIGHT: 200 LBS | RESPIRATION RATE: 16 BRPM

## 2022-11-15 PROBLEM — Z98.890 S/P ABLATION OF VENTRICULAR ARRHYTHMIA: Status: ACTIVE | Noted: 2022-11-15

## 2022-11-15 PROBLEM — Z86.79 S/P ABLATION OF VENTRICULAR ARRHYTHMIA: Status: ACTIVE | Noted: 2022-11-15

## 2022-11-15 LAB
ANION GAP SERPL CALCULATED.3IONS-SCNC: 11 MMOL/L (ref 7–16)
BASOPHILS ABSOLUTE: 0.05 E9/L (ref 0–0.2)
BASOPHILS RELATIVE PERCENT: 0.6 % (ref 0–2)
BUN BLDV-MCNC: 14 MG/DL (ref 6–23)
CALCIUM SERPL-MCNC: 9.8 MG/DL (ref 8.6–10.2)
CHLORIDE BLD-SCNC: 103 MMOL/L (ref 98–107)
CO2: 24 MMOL/L (ref 22–29)
CREAT SERPL-MCNC: 0.9 MG/DL (ref 0.7–1.2)
EKG ATRIAL RATE: 80 BPM
EKG ATRIAL RATE: 84 BPM
EKG P AXIS: 60 DEGREES
EKG P-R INTERVAL: 146 MS
EKG P-R INTERVAL: 154 MS
EKG Q-T INTERVAL: 410 MS
EKG Q-T INTERVAL: 422 MS
EKG QRS DURATION: 90 MS
EKG QRS DURATION: 92 MS
EKG QTC CALCULATION (BAZETT): 486 MS
EKG QTC CALCULATION (BAZETT): 526 MS
EKG R AXIS: -21 DEGREES
EKG R AXIS: 16 DEGREES
EKG T AXIS: 128 DEGREES
EKG T AXIS: 47 DEGREES
EKG VENTRICULAR RATE: 80 BPM
EKG VENTRICULAR RATE: 99 BPM
EOSINOPHILS ABSOLUTE: 0.24 E9/L (ref 0.05–0.5)
EOSINOPHILS RELATIVE PERCENT: 3.1 % (ref 0–6)
GFR SERPL CREATININE-BSD FRML MDRD: >60 ML/MIN/1.73
GLUCOSE BLD-MCNC: 103 MG/DL (ref 74–99)
HCT VFR BLD CALC: 43.3 % (ref 37–54)
HEMOGLOBIN: 14.2 G/DL (ref 12.5–16.5)
IMMATURE GRANULOCYTES #: 0.02 E9/L
IMMATURE GRANULOCYTES %: 0.3 % (ref 0–5)
LYMPHOCYTES ABSOLUTE: 2.02 E9/L (ref 1.5–4)
LYMPHOCYTES RELATIVE PERCENT: 25.9 % (ref 20–42)
MAGNESIUM: 2.2 MG/DL (ref 1.6–2.6)
MCH RBC QN AUTO: 31.9 PG (ref 26–35)
MCHC RBC AUTO-ENTMCNC: 32.8 % (ref 32–34.5)
MCV RBC AUTO: 97.3 FL (ref 80–99.9)
MONOCYTES ABSOLUTE: 0.72 E9/L (ref 0.1–0.95)
MONOCYTES RELATIVE PERCENT: 9.2 % (ref 2–12)
NEUTROPHILS ABSOLUTE: 4.75 E9/L (ref 1.8–7.3)
NEUTROPHILS RELATIVE PERCENT: 60.9 % (ref 43–80)
PDW BLD-RTO: 13.4 FL (ref 11.5–15)
PLATELET # BLD: 248 E9/L (ref 130–450)
PMV BLD AUTO: 10.5 FL (ref 7–12)
POTASSIUM SERPL-SCNC: 4.8 MMOL/L (ref 3.5–5)
RBC # BLD: 4.45 E12/L (ref 3.8–5.8)
SODIUM BLD-SCNC: 138 MMOL/L (ref 132–146)
WBC # BLD: 7.8 E9/L (ref 4.5–11.5)

## 2022-11-15 PROCEDURE — C1893 INTRO/SHEATH, FIXED,NON-PEEL: HCPCS

## 2022-11-15 PROCEDURE — 93662 INTRACARDIAC ECG (ICE): CPT | Performed by: STUDENT IN AN ORGANIZED HEALTH CARE EDUCATION/TRAINING PROGRAM

## 2022-11-15 PROCEDURE — 93654 COMPRE EP EVAL TX VT: CPT | Performed by: STUDENT IN AN ORGANIZED HEALTH CARE EDUCATION/TRAINING PROGRAM

## 2022-11-15 PROCEDURE — 83735 ASSAY OF MAGNESIUM: CPT

## 2022-11-15 PROCEDURE — 93654 COMPRE EP EVAL TX VT: CPT

## 2022-11-15 PROCEDURE — C1730 CATH, EP, 19 OR FEW ELECT: HCPCS

## 2022-11-15 PROCEDURE — C1894 INTRO/SHEATH, NON-LASER: HCPCS

## 2022-11-15 PROCEDURE — 6360000002 HC RX W HCPCS: Performed by: NURSE ANESTHETIST, CERTIFIED REGISTERED

## 2022-11-15 PROCEDURE — 93005 ELECTROCARDIOGRAM TRACING: CPT | Performed by: STUDENT IN AN ORGANIZED HEALTH CARE EDUCATION/TRAINING PROGRAM

## 2022-11-15 PROCEDURE — 6360000002 HC RX W HCPCS

## 2022-11-15 PROCEDURE — 93662 INTRACARDIAC ECG (ICE): CPT

## 2022-11-15 PROCEDURE — 2500000003 HC RX 250 WO HCPCS

## 2022-11-15 PROCEDURE — 3700000000 HC ANESTHESIA ATTENDED CARE

## 2022-11-15 PROCEDURE — 3700000001 HC ADD 15 MINUTES (ANESTHESIA)

## 2022-11-15 PROCEDURE — 2580000003 HC RX 258: Performed by: NURSE ANESTHETIST, CERTIFIED REGISTERED

## 2022-11-15 PROCEDURE — 80048 BASIC METABOLIC PNL TOTAL CA: CPT

## 2022-11-15 PROCEDURE — 2709999900 HC NON-CHARGEABLE SUPPLY

## 2022-11-15 PROCEDURE — 85025 COMPLETE CBC W/AUTO DIFF WBC: CPT

## 2022-11-15 PROCEDURE — 36415 COLL VENOUS BLD VENIPUNCTURE: CPT

## 2022-11-15 PROCEDURE — C1759 CATH, INTRA ECHOCARDIOGRAPHY: HCPCS

## 2022-11-15 PROCEDURE — C1732 CATH, EP, DIAG/ABL, 3D/VECT: HCPCS

## 2022-11-15 RX ORDER — SODIUM CHLORIDE 9 MG/ML
INJECTION, SOLUTION INTRAVENOUS CONTINUOUS PRN
Status: DISCONTINUED | OUTPATIENT
Start: 2022-11-15 | End: 2022-11-15 | Stop reason: SDUPTHER

## 2022-11-15 RX ORDER — MIDAZOLAM HYDROCHLORIDE 1 MG/ML
INJECTION INTRAMUSCULAR; INTRAVENOUS PRN
Status: DISCONTINUED | OUTPATIENT
Start: 2022-11-15 | End: 2022-11-15 | Stop reason: SDUPTHER

## 2022-11-15 RX ORDER — FENTANYL CITRATE 50 UG/ML
INJECTION, SOLUTION INTRAMUSCULAR; INTRAVENOUS PRN
Status: DISCONTINUED | OUTPATIENT
Start: 2022-11-15 | End: 2022-11-15 | Stop reason: SDUPTHER

## 2022-11-15 RX ORDER — PANTOPRAZOLE SODIUM 20 MG/1
20 TABLET, DELAYED RELEASE ORAL
Qty: 42 TABLET | Refills: 0 | Status: SHIPPED | OUTPATIENT
Start: 2022-11-15 | End: 2022-12-27

## 2022-11-15 RX ORDER — ASPIRIN 325 MG
325 TABLET, DELAYED RELEASE (ENTERIC COATED) ORAL DAILY
Qty: 42 TABLET | Refills: 0 | Status: SHIPPED | OUTPATIENT
Start: 2022-11-15 | End: 2022-12-27

## 2022-11-15 RX ORDER — ACETAMINOPHEN 325 MG/1
650 TABLET ORAL EVERY 4 HOURS PRN
Status: DISCONTINUED | OUTPATIENT
Start: 2022-11-15 | End: 2022-11-15 | Stop reason: HOSPADM

## 2022-11-15 RX ORDER — SODIUM CHLORIDE 9 MG/ML
INJECTION, SOLUTION INTRAVENOUS PRN
Status: DISCONTINUED | OUTPATIENT
Start: 2022-11-15 | End: 2022-11-15 | Stop reason: HOSPADM

## 2022-11-15 RX ORDER — SODIUM CHLORIDE 0.9 % (FLUSH) 0.9 %
5-40 SYRINGE (ML) INJECTION EVERY 12 HOURS SCHEDULED
Status: DISCONTINUED | OUTPATIENT
Start: 2022-11-15 | End: 2022-11-15 | Stop reason: HOSPADM

## 2022-11-15 RX ORDER — SODIUM CHLORIDE 0.9 % (FLUSH) 0.9 %
5-40 SYRINGE (ML) INJECTION PRN
Status: DISCONTINUED | OUTPATIENT
Start: 2022-11-15 | End: 2022-11-15 | Stop reason: HOSPADM

## 2022-11-15 RX ORDER — PROPOFOL 10 MG/ML
INJECTION, EMULSION INTRAVENOUS PRN
Status: DISCONTINUED | OUTPATIENT
Start: 2022-11-15 | End: 2022-11-15 | Stop reason: SDUPTHER

## 2022-11-15 RX ADMIN — FENTANYL CITRATE 50 MCG: 50 INJECTION, SOLUTION INTRAMUSCULAR; INTRAVENOUS at 09:43

## 2022-11-15 RX ADMIN — PROPOFOL 35 MCG/KG/MIN: 10 INJECTION, EMULSION INTRAVENOUS at 09:01

## 2022-11-15 RX ADMIN — PROPOFOL 40 MG: 10 INJECTION, EMULSION INTRAVENOUS at 09:23

## 2022-11-15 RX ADMIN — SODIUM CHLORIDE: 9 INJECTION, SOLUTION INTRAVENOUS at 07:50

## 2022-11-15 RX ADMIN — FENTANYL CITRATE 75 MCG: 50 INJECTION, SOLUTION INTRAMUSCULAR; INTRAVENOUS at 09:23

## 2022-11-15 RX ADMIN — PROPOFOL 200 MG: 10 INJECTION, EMULSION INTRAVENOUS at 08:28

## 2022-11-15 RX ADMIN — MIDAZOLAM 1 MG: 1 INJECTION INTRAMUSCULAR; INTRAVENOUS at 09:23

## 2022-11-15 ASSESSMENT — LIFESTYLE VARIABLES: SMOKING_STATUS: 1

## 2022-11-15 ASSESSMENT — ENCOUNTER SYMPTOMS: DYSPNEA ACTIVITY LEVEL: AFTER AMBULATING 1 FLIGHT OF STAIRS

## 2022-11-15 NOTE — PROGRESS NOTES
Discharge instructions reviewed with patient. Patient verbalized understanding. Iv's removed. Heart monitor cleaned and returned to nurses station.

## 2022-11-15 NOTE — PROGRESS NOTES
CLINICAL PHARMACY NOTE: MEDS TO BEDS    Total # of Prescriptions Filled: 2   The following medications were delivered to the patient:  Aspirin 325 mg  Pantoprazole 20 mg    Additional Documentation:    Delivered meds to patient @2:58

## 2022-11-15 NOTE — DISCHARGE INSTRUCTIONS
Ablation Patient Discharge Instructions    Medications: resume all previously prescribed medications. START aspirin 325 mg daily for 6 weeks. START pantoprazole 20 mg daily for 6 weeks. You were provided both of these prescriptions at the hospital prior to discharge. Follow-Up:  -ECG: you will be contacted to schedule a follow up appointment for an EKG in 1 week. If you are not contacted within 3 business days, please call the [facility] office to schedule this appointment.  -Dr Dougherty Poster Office: you will be contacted to schedule a follow up appointment with Dr Dougherty Poster office in 4 weeks. If you are not contacted within 3 business days, please call the [facility] office to schedule this appointment. Questions/concerns: please contact the office at [phone]. It is not uncommon for patients to experience brief episodes of palpitations, but please call the [facility] office if you are having frequent symptoms. Incision Care: Check bilateral groins daily. No soaking in a bathtub, hot tub, or pool for one week. You may shower. If you find any redness, swelling, drainage, warmth, or have a fever greater than 100 degrees, notify the office immediately at [phone]. Activity: No lifting greater than 10 lbs for 7 days, and no strenuous exercise for 2 weeks. No driving or operating heavy machinery for 48 hours. After 48 hours, you may drive if you feel up to it. [facility]:   [address] [place] [address], [address] [place] [address]) Electrophysiology   [phone]
2019

## 2022-11-15 NOTE — OP NOTE
Operative Note      Patient: Abhishek Pickett  YOB: 1960  MRN: [id number]    Date of Procedure: 11/15/2022    Patient History: Abhishek Pickett is a 57 y.o. male with a history of PVCs, NYHA class I HFpEF-borderline 2/2 PVC, alcohol abuse (resolved), and cannabis use (resolved). He is managed by Dr. Argentina Loja with Lisinopril 2.5 mg daily, Toprol XL 50mg daily, and Crestor 10 mg daily. 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 In 12/2021, a 2 week event monitor reproted VE burden of 42% and frequent NSVT. He stopped EtOH/durg use. In 7/2022, he was referred to my office and reported cessation of EtOH/drug use. ECG consistent with anterior RVOT PASCALE. A 72 hour 3 lead monitor reported VE burden of 43% of predominantly single morphology and a TTE reported persistent LVEF of 45-50%. He reports symptoms of dyspnea. After discussion of management options related to symptomatic PVC and reduced LVEF (AA v ablation), including indications, material risks, and benefits of each option; he desired PVC ablation. He presents today, 11/15/2022; for outpatient PVC ablation. He denies any other complaints at this time. Pre-Op Diagnosis: symptomatic unifocal PVC with LVEF = 45-50%    Post-Op Diagnosis: Same       Procedure:   -PVC ablation (CPT code: 15583)  -Intracardiac echocardiography (CPT code: 98189)    Surgeon: Alina Francis DO     Assistant: none    Anesthesia: moderate sedation, separate Anesthesia note    Estimated Blood Loss (mL): less than 498     Complications: None    Detailed Description of Procedure:   Verbal and written informed consent obtained from the patient and placed in the chart. The patient was brought to the EP lab in a fasting state with sinus rhythm frequent premature ventricular beats of single morphology as the presenting rhythm. The defibrillator pads were attached to the patient.  Moderate sedation/analgesia/conscious sedation was administered per Anesthesia providers with short acting sedative propofol in order to avoid PVB suppression, which can occur with deeper levels of sedation and use of longer acting sedatives that suppress sympathetic tone. The right and left groin sites were prepared with chlorhexidine gluconate and draped in a sterile fashion. Ultrasound was used to guide central venous access in order to reduce vascular access complications and time to gain access. Using a modified Seldinger technique, a 63 cm 8.5 F braided SR-0 sheath and 6 Fr short sheath were placed in the right femoral vein under ultrasound guidance. A 9 F short sheath was placed in the left femoral vein. The 9 F sheath was used to introduce 8F 90 cm [employer]ter SOUNDSTAR intracardiac echocardiography (ICE) catheter, which was then advanced into the RA. Baseline images were obtained. A patent foramen ovale was not observed on ICE imaging. Pericardial effusion was not observed. Using ICE images a 3D anatomical map was created of the tricuspid valve annulus, RV, RVOT, and aortic valve. A 6 F [employer] Augustine Hexapolar IVC electrode A curve catheter was positioned in the RV apex via the 6 F short sheath. The SR-0 was used to introduce the [address] F curve into the RV. A 3D electroanatomic map of the RV was obtained. The His position was marked on the map. The earliest site of activation was noted to be at anterior RVOT, which was 30 msec pre-QRS with unipolar morphology. The PENTARAY catheter was exchanged for the 3.5 mm 115 cm [employer] Augustine THERMOCOOL SMARTTOUCH cool flow bidirectional DF curve. A CF target of greater than or equal to 10 grams and less than 38 grams was used to ensure effective lesion and avoid injury to adjacent structures and steam pop / perforation. Ablation was performed with 20 W then titrated up to 30 W while monitoring impedance with target drop of 10 ohms with lesion duration of 30 seconds - 60 seconds.  Real-time automated display of RF ablation applications with [employer] system was used. Following ablation, PVBs were no longer observed. ICE catheter repositioned in RV and no pericardial effusion observed. Patient was monitored for 20 minutes to ensure successful ablation. Catheters removed. Sheaths removed. Hemostasis achieved with manual pressure and stopcock sutures bilaterally. SUMMARY: Successful radiofrequency ablation of anterior RVOT PVC.     Electronically signed by Elijah Cervantes DO on 11/15/2022 at 7:33 AM

## 2022-11-15 NOTE — ANESTHESIA POSTPROCEDURE EVALUATION
Department of Anesthesiology  Postprocedure Note    Patient: Evan Salter  MRN: [id number]  YOB: 1960  Date of evaluation: 11/15/2022      Procedure Summary     Date: 11/15/22 [place] [facility] [place]    Anesthesia Start: 9892 Anesthesia Stop: 1112    Procedure: ABLATION WITH ANESTHESIA Diagnosis:       Paroxysmal atrial fibrillation      S/P ablation of ventricular arrhythmia    Scheduled Providers: KAPIL Montilla - CRNA; Юлия Yun DO Responsible Provider: Юлия Yun DO    Anesthesia Type: MAC ASA Status: 4          Anesthesia Type: No value filed. Larry Phase I:      Larry Phase II:        Anesthesia Post Evaluation    Patient location during evaluation: bedside  Patient participation: complete - patient cannot participate  Level of consciousness: awake and alert  Airway patency: patent  Nausea & Vomiting: no nausea and no vomiting  Complications: no  Cardiovascular status: blood pressure returned to baseline  Respiratory status: acceptable  Hydration status: euvolemic  There was medical reason for not using a multimodal analgesia pain management approach.

## 2022-11-15 NOTE — ANESTHESIA PRE PROCEDURE
Department of Anesthesiology  Preprocedure Note       Name:  Rosaura Hope   Age:  57 y.o.  :  1960                                          MRN:  [id number]         Date:  11/15/2022      Surgeon: Dr. Sunshine Rangel   Procedure: PVC Ablation, EPS    Medications prior to admission:   Prior to Admission medications    Medication Sig Start Date End Date Taking? Authorizing Provider   albuterol sulfate HFA (PROVENTIL;VENTOLIN;PROAIR) 108 (90 Base) MCG/ACT inhaler 2 puffs 22   Historical Provider, MD   MAGnesium-Oxide 400 (240 Mg) MG tablet TAKE 1 TABLET BY MOUTH DAILY 22   KAPIL Russ CNP   metoprolol succinate (TOPROL XL) 50 MG extended release tablet Take 1 tablet by mouth in the morning and 1 tablet before bedtime. Patient not taking: Reported on 11/3/2022 8/11/22   KAPIL Russ CNP   tiotropium (SPIRIVA) 18 MCG inhalation capsule Inhale 18 mcg into the lungs in the morning. Historical Provider, MD   rosuvastatin (CRESTOR) 10 MG tablet TAKE 1 TABLET BY MOUTH EVERY DAY 22   Historical Provider, MD   lisinopril (PRINIVIL;ZESTRIL) 2.5 MG tablet Take 1 tablet by mouth daily 21   Bala Gaspar MD   citalopram (CELEXA) 40 MG tablet Take 1 tablet by mouth daily    Historical Provider, MD       Current medications:    Current Outpatient Medications   Medication Sig Dispense Refill    albuterol sulfate HFA (PROVENTIL;VENTOLIN;PROAIR) 108 (90 Base) MCG/ACT inhaler 2 puffs      MAGnesium-Oxide 400 (240 Mg) MG tablet TAKE 1 TABLET BY MOUTH DAILY 90 tablet 3    metoprolol succinate (TOPROL XL) 50 MG extended release tablet Take 1 tablet by mouth in the morning and 1 tablet before bedtime. (Patient not taking: Reported on 11/3/2022) 180 tablet 3    tiotropium (SPIRIVA) 18 MCG inhalation capsule Inhale 18 mcg into the lungs in the morning.       rosuvastatin (CRESTOR) 10 MG tablet TAKE 1 TABLET BY MOUTH EVERY DAY      lisinopril (PRINIVIL;ZESTRIL) 2.5 MG tablet Take 1 tablet by mouth daily 30 tablet 3    citalopram (CELEXA) 40 MG tablet Take 1 tablet by mouth daily       No current facility-administered medications for this encounter. Allergies:  No Known Allergies    Problem List:    Patient Active Problem List   Diagnosis Code    Frequent PVCs I49.3    PVC (premature ventricular contraction) I49.3    Chest pain R07.9    Chronic systolic (congestive) heart failure (HCC) I50.22    Unstable angina (HCC) I20.0       Past Medical History:        Diagnosis Date    Chronic systolic (congestive) heart failure ([facility] [address] 9/10/2021       Past Surgical History:  No past surgical history on file. Social History:    Social History     Tobacco Use    Smoking status: Former     Packs/day: 1.50     Years: 40.00     Pack years: 60.00     Types: Cigarettes     Quit date: 2022     Years since quittin.4    Smokeless tobacco: Never   Substance Use Topics    Alcohol use: Yes     Comment: 1 beer/day                                Counseling given: Not Answered      Vital Signs (Current):   Vitals:    11/15/22 0624   BP: (!) 150/100   Pulse: 64   Resp: 16   Temp: (!) 35.9 °C (96.7 °F)   SpO2: 93%   Weight: 200 lb (90.7 kg)   Height: 6' (1.829 m)                                              BP Readings from Last 3 Encounters:   11/15/22 (!) 150/100   22 132/80   10/05/22 130/82       NPO Status:  >12 hrs                                                                                BMI:   Wt Readings from Last 3 Encounters:   11/15/22 200 lb (90.7 kg)   22 202 lb (91.6 kg)   10/05/22 205 lb 9.6 oz (93.3 kg)     Body mass index is 27.12 kg/m².     CBC:   Lab Results   Component Value Date/Time    WBC 8.4 2021 05:27 AM    RBC 4.71 2021 05:27 AM    HGB 15.0 2021 05:27 AM    HCT 43.7 2021 05:27 AM    MCV 92.8 2021 05:27 AM    RDW 14.2 2021 05:27 AM     2021 05:27 AM       CMP:   Lab Results   Component Value Date/Time     2021 05:27 AM    K 4.5 09/11/2021 05:27 AM    K 5.0 09/10/2021 05:23 AM     09/11/2021 05:27 AM    CO2 25 09/11/2021 05:27 AM    BUN 16 09/11/2021 05:27 AM    CREATININE 0.8 09/11/2021 05:27 AM    GFRAA >60 09/11/2021 05:27 AM    LABGLOM >60 09/11/2021 05:27 AM    GLUCOSE 101 09/11/2021 05:27 AM    PROT 6.7 09/10/2021 05:23 AM    CALCIUM 9.0 09/11/2021 05:27 AM    BILITOT 0.5 09/10/2021 05:23 AM    ALKPHOS 81 09/10/2021 05:23 AM    AST 13 09/10/2021 05:23 AM    ALT 14 09/10/2021 05:23 AM       POC Tests: No results for input(s): POCGLU, POCNA, POCK, POCCL, POCBUN, POCHEMO, POCHCT in the last 72 hours. Coags:   Lab Results   Component Value Date/Time    PROTIME 10.8 09/09/2021 06:30 AM    INR 1.0 09/09/2021 06:30 AM    APTT 23.3 09/09/2021 06:30 AM       HCG (If Applicable): No results found for: PREGTESTUR, PREGSERUM, HCG, HCGQUANT     ABGs: No results found for: PHART, PO2ART, AVM1KHG, LRJ9OPW, BEART, B1QNDOLY     Type & Screen (If Applicable):  No results found for: LABABO, LABRH    Drug/Infectious Status (If Applicable):  No results found for: HIV, HEPCAB    COVID-19 Screening (If Applicable):   Lab Results   Component Value Date/Time    COVID19 Not Detected 09/09/2021 10:28 AM           Anesthesia Evaluation  Patient summary reviewed and Nursing notes reviewed no history of anesthetic complications:   Airway: Mallampati: II  TM distance: >3 FB   Neck ROM: full  Mouth opening: > = 3 FB   Dental:          Pulmonary: breath sounds clear to auscultation  (+) COPD:  current smoker (Vape Use, 11/14/22)          Patient did not smoke on day of surgery.                  Cardiovascular:  Exercise tolerance: poor (<4 METS),   (+) angina (INFREQUENT- LAST 1 YR AGO):, dysrhythmias (NSVT): PVC, CHF: systolic, ELAM: after ambulating 1 flight of stairs,       ECG reviewed  Rhythm: regular  Rate: normal  Echocardiogram reviewed    Cleared by cardiology     Beta Blocker:  Not on Beta Blocker (Last Metoprolol 11/10/22) Neuro/Psych:   Negative Neuro/Psych ROS              GI/Hepatic/Renal: Neg GI/Hepatic/Renal ROS            Endo/Other:              Pt had no PAT visit        ROS comment: ETOH last wk  Marijuana last few wks ago Abdominal:             Vascular: negative vascular ROS. Other Findings:      ECHO 9/15/22   Findings      Left Ventricle   Mild concentric left ventricular hypertrophy. Left ventricular diastolic filling is elevated . Ejection fraction is visually estimated at 45 to 50%. Right Ventricle   Normal right ventricular size and function. Left Atrium   Normal sized left atrium. Right Atrium   Normal right atrium size. Mitral Valve   Mild mitral regurgitation is present. Tricuspid Valve   Physiologic and/or trace tricuspid regurgitation. Aortic Valve   The aortic valve is trileaflet. No hemodynamically significant aortic stenosis is present. Pulmonic Valve   The pulmonic valve was not well visualized. Aorta   Aorta was not clearly visualized. Conclusions      Summary   Technically difficult examination. Mild concentric left ventricular hypertrophy. Left ventricular diastolic filling is elevated . Ejection fraction is visually estimated at 45 to 50%. Normal right ventricular size and function. Mild mitral regurgitation is present. Physiologic and/or trace tricuspid regurgitation. Signature      ----------------------------------------------------------------   Electronically signed by Suad Jackson MD(Interpreting   physician) on 09/21/2022 06:45 AM   ----------------------------------------------------------------     Left Heart Cath 9/10/22  Coronary anatomy:  Dominance: Right  1. Left main: Angiographically unremarkable  2. LAD: Large vessel that supplies the entire apex. It gives rise to a large bifurcating first diagonal.  In its distal portion it gives rise to a very large bifurcating septal branch.   The LAD has mild diffuse disease  3. Left circumflex: This is a smaller vessel with a small OM. Has no significant disease angiographically  4. RCA: This is a large tortuous vessel with a large RPDA and large terminal RPL of multiple smaller RPL branches. It has minimal luminal irregularities     Hemodynamics:  LVEDP 15  Ao: 124/63 with a mean of 106       12 Lead ECG 11/6/22  Sinus  Rhythm  -Frequent pvcs -ventricular bigeminy   Low voltage in limb leads.    -  Nonspecific T-abnormality. ABNORMAL      Anesthesia Plan      MAC     ASA 4       Induction: intravenous. Anesthetic plan and risks discussed with patient. Use of blood products discussed with patient whom consented to blood products. Plan discussed with attending.                     Dilip Ferguson RN   11/15/2022

## 2022-11-15 NOTE — TELEPHONE ENCOUNTER
----- Message from Stan Botello DO sent at 11/15/2022  3:27 PM EST -----  Regarding: appt  Ecg in 1 week    Me or [facility] in 4 weeks.     Stan Botello DO

## 2022-11-15 NOTE — ANESTHESIA PRE PROCEDURE
Department of Anesthesiology  Preprocedure Note       Name:  Emy Oneill   Age:  57 y.o.  :  1960                                          MRN:  [id number]         Date:  11/15/2022      Surgeon: * No surgeons listed *    Procedure: * No procedures listed *    Medications prior to admission:   Prior to Admission medications    Medication Sig Start Date End Date Taking? Authorizing Provider   albuterol sulfate HFA (PROVENTIL;VENTOLIN;PROAIR) 108 (90 Base) MCG/ACT inhaler 2 puffs 22   Historical Provider, MD   MAGnesium-Oxide 400 (240 Mg) MG tablet TAKE 1 TABLET BY MOUTH DAILY 22   KAPIL Leigh - CNP   metoprolol succinate (TOPROL XL) 50 MG extended release tablet Take 1 tablet by mouth in the morning and 1 tablet before bedtime. Patient not taking: Reported on 11/3/2022 8/11/22   KAPIL Leigh - CNP   tiotropium (SPIRIVA) 18 MCG inhalation capsule Inhale 18 mcg into the lungs in the morning. Historical Provider, MD   rosuvastatin (CRESTOR) 10 MG tablet TAKE 1 TABLET BY MOUTH EVERY DAY 22   Historical Provider, MD   lisinopril (PRINIVIL;ZESTRIL) 2.5 MG tablet Take 1 tablet by mouth daily 21   Harini Thomson MD   citalopram (CELEXA) 40 MG tablet Take 1 tablet by mouth daily    Historical Provider, MD       Current medications:    Current Outpatient Medications   Medication Sig Dispense Refill    albuterol sulfate HFA (PROVENTIL;VENTOLIN;PROAIR) 108 (90 Base) MCG/ACT inhaler 2 puffs      MAGnesium-Oxide 400 (240 Mg) MG tablet TAKE 1 TABLET BY MOUTH DAILY 90 tablet 3    metoprolol succinate (TOPROL XL) 50 MG extended release tablet Take 1 tablet by mouth in the morning and 1 tablet before bedtime. (Patient not taking: Reported on 11/3/2022) 180 tablet 3    tiotropium (SPIRIVA) 18 MCG inhalation capsule Inhale 18 mcg into the lungs in the morning.       rosuvastatin (CRESTOR) 10 MG tablet TAKE 1 TABLET BY MOUTH EVERY DAY      lisinopril (PRINIVIL;ZESTRIL) 2.5 MG tablet Take 1 tablet by mouth daily 30 tablet 3    citalopram (CELEXA) 40 MG tablet Take 1 tablet by mouth daily       No current facility-administered medications for this encounter. Allergies:  No Known Allergies    Problem List:    Patient Active Problem List   Diagnosis Code    Frequent PVCs I49.3    PVC (premature ventricular contraction) I49.3    Chest pain R07.9    Chronic systolic (congestive) heart failure (HCC) I50.22    Unstable angina (HCC) I20.0       Past Medical History:        Diagnosis Date    Chronic systolic (congestive) heart failure ([facility] [address] 9/10/2021       Past Surgical History:  No past surgical history on file. Social History:    Social History     Tobacco Use    Smoking status: Former     Packs/day: 1.50     Years: 40.00     Pack years: 60.00     Types: Cigarettes     Quit date: 2022     Years since quittin.4    Smokeless tobacco: Never   Substance Use Topics    Alcohol use: Yes     Comment: 1 beer/day                                Counseling given: Not Answered      Vital Signs (Current):   Vitals:    11/15/22 0624   BP: (!) 150/100   Pulse: 64   Resp: 16   Temp: (!) 96.7 °F (35.9 °C)   SpO2: 93%   Weight: 200 lb (90.7 kg)   Height: 6' (1.829 m)                                              BP Readings from Last 3 Encounters:   11/15/22 (!) 150/100   22 132/80   10/05/22 130/82       NPO Status:                                                                                 BMI:   Wt Readings from Last 3 Encounters:   11/15/22 200 lb (90.7 kg)   22 202 lb (91.6 kg)   10/05/22 205 lb 9.6 oz (93.3 kg)     Body mass index is 27.12 kg/m².     CBC:   Lab Results   Component Value Date/Time    WBC 8.4 2021 05:27 AM    RBC 4.71 2021 05:27 AM    HGB 15.0 2021 05:27 AM    HCT 43.7 2021 05:27 AM    MCV 92.8 2021 05:27 AM    RDW 14.2 2021 05:27 AM     2021 05:27 AM       CMP:   Lab Results   Component Value Date/Time     09/11/2021 05:27 AM    K 4.5 09/11/2021 05:27 AM    K 5.0 09/10/2021 05:23 AM     09/11/2021 05:27 AM    CO2 25 09/11/2021 05:27 AM    BUN 16 09/11/2021 05:27 AM    CREATININE 0.8 09/11/2021 05:27 AM    GFRAA >60 09/11/2021 05:27 AM    LABGLOM >60 09/11/2021 05:27 AM    GLUCOSE 101 09/11/2021 05:27 AM    PROT 6.7 09/10/2021 05:23 AM    CALCIUM 9.0 09/11/2021 05:27 AM    BILITOT 0.5 09/10/2021 05:23 AM    ALKPHOS 81 09/10/2021 05:23 AM    AST 13 09/10/2021 05:23 AM    ALT 14 09/10/2021 05:23 AM       POC Tests: No results for input(s): POCGLU, POCNA, POCK, POCCL, POCBUN, POCHEMO, POCHCT in the last 72 hours.     Coags:   Lab Results   Component Value Date/Time    PROTIME 10.8 09/09/2021 06:30 AM    INR 1.0 09/09/2021 06:30 AM    APTT 23.3 09/09/2021 06:30 AM       HCG (If Applicable): No results found for: PREGTESTUR, PREGSERUM, HCG, HCGQUANT     ABGs: No results found for: PHART, PO2ART, JEO7ORP, TVR0PUF, BEART, C7VYVWKL     Type & Screen (If Applicable):  No results found for: LABABO, LABRH    Drug/Infectious Status (If Applicable):  No results found for: HIV, HEPCAB    COVID-19 Screening (If Applicable):   Lab Results   Component Value Date/Time    COVID19 Not Detected 09/09/2021 10:28 AM           Anesthesia Evaluation     Anesthesia Plan        Jose Saavedra DO   11/15/2022

## 2022-11-15 NOTE — PROGRESS NOTES
Bilateral stop cocks removed. Manual pressure held for ten minutes. Quick clots and opsites applied bilaterally. Sites soft, stable, no bleeding or hematoma. 2+ pulses bilateral pedal. Patient instructed on groin care and limitation.

## 2022-11-15 NOTE — PROGRESS NOTES
Ambulated patient down hallway without any issues. Groin sites rechecked, soft, stable, no bleeding or hematoma.

## 2022-11-22 ENCOUNTER — NURSE ONLY (OUTPATIENT)
Dept: NON INVASIVE DIAGNOSTICS | Age: 62
End: 2022-11-22
Payer: COMMERCIAL

## 2022-11-22 DIAGNOSIS — I48.0 PAROXYSMAL ATRIAL FIBRILLATION (HCC): Primary | ICD-10-CM

## 2022-11-22 PROCEDURE — 93000 ELECTROCARDIOGRAM COMPLETE: CPT | Performed by: STUDENT IN AN ORGANIZED HEALTH CARE EDUCATION/TRAINING PROGRAM

## 2022-11-22 NOTE — PROGRESS NOTES
Patient was in today for EKG per Dr. Kendal Smart, 1 week s/p ablation. Patient has no complaints.     Electronically signed by Corrine Sicard, MA on 11/22/2022 at 11:31 AM

## 2022-11-29 NOTE — H&P
[facility] CARDIOLOGY  CARDIAC ELECTROPHYSIOLOGY DEPARTMENT/DIVISION OF CARDIOLOGY  H&P Report  PATIENT: Leobardo Yan RECORD NUMBER: [id number]  DATE OF SERVICE:  2022  ATTENDING ELECTROPHYSIOLOGIST:  Jazmyne Del Castillo D.O.  REFERRING PHYSICIAN: Mario Callahan DO and Abraham Fraire DO  CHIEF COMPLAINT: PVC    HPI: Sylvie Mares is a 60 y.o. male with a history of PVCs, NYHA class I HFpEF-borderline 2/2 PVC, alcohol abuse (resolved), and cannabis use (resolved). He is managed by Dr. Raymundo Pal with albuterol, Celexa, Lisinopril 2.5mg daily, magOx 400mg, Toprol XL 50mg daily (currently on hold for planned PVC ablation) Crestor, Spiriva. In 2021, he was diagnosed with NICM (LVEF = 45-50%) 2/2 PVC, which was treated with GDMT. In 2021, a 2 week event monitor reproted VE burden of 42% and frequent NSVT. He stopped EtOH/durg use. In 2022, he was referred to my office and reported cessation of EtOH/drug use. ECG consistent with anterior RVOT PASCALE. A 72 hour 3 lead monitor reported VE burden of 43% of predominantly single morphology and a TTE reported persistent LVEF of 45-50%. He presents today, 2022; for follow-up with my office. He is to undergo PVC ablation on 2022. And notes ongoing dyspnea on exertion that is stable, all question answered regarding upcoming procedure.      Prior cardiac testing:   Limited TTE: 9/15/2022: LVEF 45-50%, mild MR, trace TR, LVH 1.5cm  72 hour event monitor (22): SR at 65 - 110 bpm (mean: 101 bpm),1 patient event during SR with runs of VE, SVE burden = 0%, VE burden = 42.95% (5 morphologies, 98.08% single morphology), 12,021 runs of VE (longest: 7 beats; fastest: 248 bpm), no AF, SVT, 2* type II or 3* AV block, or significant pauses  EC22: SR 84 bpm, PVCs (likely anterior RVOT)  14 day event monitor (21): SR at  bpm (mean: 86 bpm), SVE burden less than 1%, 230 episodes of asymptomatic and nonsustained SVT (longest: 16 beats at 167 bpm; fastest 200 bpm for 6 beats), VE burden approximately 42%, approximately 5000 episodes of asymptomatic and nonsustained VT (longest: 11 beats at 180 bpm; fastest: 245 bpm for 5 beats), no patient events, no AF, second-degree type II or third-degree AV block, or significant pauses. LHC (9/10/2021): RCA dominant system, no significant CAD. TTE (9/10/2021): LVEF = 45-50%, stage I LVDD. Exercise nuclear stress test (2021): No ischemic ECG changes at 86% of APM HR, frequent PVCs during exercise, infarct of mid-distal inferior septum and anterior septum with minimal homa-infarct ischemia, no transient ischemic dilation, below average exercise capacity for age/sex. Past Medical History:   Diagnosis Date    Chronic systolic (congestive) heart failure ([address] 9/10/2021     Past Surgical History:   Procedure Laterality Date    ABLATION OF DYSRHYTHMIC FOCUS  11/15/2022    PVC  (Dr. Jayashree Lopez)        No family history on file. There is no family history of sudden cardiac arrest    Social History     Tobacco Use    Smoking status: Former     Packs/day: 1.50     Years: 40.00     Pack years: 60.00     Types: Cigarettes     Quit date: 2022     Years since quittin.4    Smokeless tobacco: Never   Substance Use Topics    Alcohol use: Yes     Comment: 1 beer/day       No current facility-administered medications for this encounter. Current Outpatient Medications   Medication Sig Dispense Refill    aspirin 325 MG EC tablet Take 1 tablet by mouth daily 42 tablet 0    pantoprazole (PROTONIX) 20 MG tablet Take 1 tablet by mouth every morning (before breakfast) 42 tablet 0    albuterol sulfate HFA (PROVENTIL;VENTOLIN;PROAIR) 108 (90 Base) MCG/ACT inhaler 2 puffs      MAGnesium-Oxide 400 (240 Mg) MG tablet TAKE 1 TABLET BY MOUTH DAILY 90 tablet 3    metoprolol succinate (TOPROL XL) 50 MG extended release tablet Take 1 tablet by mouth in the morning and 1 tablet before bedtime.  180 tablet 3    tiotropium (SPIRIVA) 18 MCG inhalation capsule Inhale 18 mcg into the lungs in the morning. rosuvastatin (CRESTOR) 10 MG tablet TAKE 1 TABLET BY MOUTH EVERY DAY      lisinopril (PRINIVIL;ZESTRIL) 2.5 MG tablet Take 1 tablet by mouth daily 30 tablet 3    citalopram (CELEXA) 40 MG tablet Take 1 tablet by mouth daily          No Known Allergies    ROS:   Constitutional: Negative for fever, activity change and appetite change. HENT: Negative for epistaxis. Eyes: Negative for diploplia, blurred vision. Respiratory: Negative for cough, chest tightness, shortness of breath and wheezing. Cardiovascular: pertinent positives in HPI  Gastrointestinal: Negative for abdominal pain and blood in stool. Genitourinary: Negative for hematuria and difficulty urinating. Musculoskeletal: Negative for myalgias and gait problem. Skin: Negative for color change and rash. Neurological: Negative for syncope and light-headedness. Psychiatric/Behavioral: Negative for confusion and agitation. The patient is not nervous/anxious. Heme: no bleeding disorders, no melena or hematochezia  All other review of systems are negative     PHYSICAL EXAM:  /76   Pulse 83   Temp 97 °F (36.1 °C) (Temporal)   Resp 16   Ht 6' (1.829 m)   Wt 200 lb (90.7 kg)   SpO2 96%   BMI 27.12 kg/m²     Constitutional: Well-developed, no acute distress, well groomed  Eyes: conjunctivae normal, no xanthelasma   Ears, Nose, Throat: oral mucosa moist, no cyanosis   Neck: supple, no JVD, no thyromegaly   CV: normal rate, regular rhythm,  no murmurs, rubs, or gallops.  PMI is nondisplaced, Peripheral pulses normal including carotid auscultation, no noted aortic bruit, bilateral femoral and pedal pulses are normal in quality  Lungs: clear to auscultation bilaterally, normal respiratory effort without used of accessory muscles, no wheezes  Abdomen: soft, non-tender, bowel sounds present, no masses or hepatomegaly   Extremities: no digital clubbing, no edema Skin: warm, no rashes   Neuro/Psych: A&O x 3, normal mood and affect    Cardiac testing done today:   EC/3/22: NSR with PVCs with couplets  rate 94 bpm, NV 150ms, , QTc 433ms      Assessment/plan:  1. PVCs  - ECG: Likely PASCALE anterior RVOT location.  - TTE: persistent LVEF of 45-50% despite GDMT and EtOH/drug use cessation. -- Event monitor: VE burden = 43% of predominantly single morphology. -- I reviewed indications, material risks, and benefits of options (AA v ablation). He desires ablation. Held metoprolol XL 50 mg BID starting 5 days prior to ablation procedure.  -Follow-up  in 1 month after ablation. 2. NYHA Class I HFpEF-borderline/nonischemic 2/2 PVC  -- Management per Dr Alicia Tyson. -- Plan for TTE 3 months after ablation to reassess LVEF. Thank you for allowing me to participate in your patient's care. Please call me if there are any questions.       Electronically signed by Howard Child DO on 2022 at 10:34 PM  Cardiac Electrophysiology  [address] [place] [address]

## 2022-12-08 ENCOUNTER — OFFICE VISIT (OUTPATIENT)
Dept: NON INVASIVE DIAGNOSTICS | Age: 62
End: 2022-12-08
Payer: COMMERCIAL

## 2022-12-08 VITALS
HEART RATE: 80 BPM | BODY MASS INDEX: 28.44 KG/M2 | DIASTOLIC BLOOD PRESSURE: 70 MMHG | RESPIRATION RATE: 16 BRPM | HEIGHT: 72 IN | SYSTOLIC BLOOD PRESSURE: 130 MMHG | WEIGHT: 210 LBS

## 2022-12-08 DIAGNOSIS — Z98.890 S/P ABLATION OF VENTRICULAR ARRHYTHMIA: ICD-10-CM

## 2022-12-08 DIAGNOSIS — Z86.79 S/P ABLATION OF VENTRICULAR ARRHYTHMIA: ICD-10-CM

## 2022-12-08 DIAGNOSIS — J44.9 CHRONIC OBSTRUCTIVE PULMONARY DISEASE, UNSPECIFIED COPD TYPE (HCC): ICD-10-CM

## 2022-12-08 DIAGNOSIS — I49.3 PVC (PREMATURE VENTRICULAR CONTRACTION): Primary | ICD-10-CM

## 2022-12-08 DIAGNOSIS — I42.8 NICM (NONISCHEMIC CARDIOMYOPATHY) (HCC): ICD-10-CM

## 2022-12-08 PROBLEM — R94.31 ELECTROCARDIOGRAM ABNORMAL: Status: ACTIVE | Noted: 2021-09-08

## 2022-12-08 PROBLEM — F34.1 DYSTHYMIA: Status: ACTIVE | Noted: 2020-11-16

## 2022-12-08 PROBLEM — M51.369 DEGENERATION OF LUMBAR INTERVERTEBRAL DISC: Status: ACTIVE | Noted: 2020-03-19

## 2022-12-08 PROBLEM — F17.200 TOBACCO DEPENDENCE SYNDROME: Status: ACTIVE | Noted: 2020-11-16

## 2022-12-08 PROBLEM — R53.83 FATIGUE: Status: ACTIVE | Noted: 2021-09-08

## 2022-12-08 PROBLEM — I49.9 IRREGULAR HEART BEAT: Status: ACTIVE | Noted: 2021-09-08

## 2022-12-08 PROBLEM — E78.2 MIXED HYPERLIPIDEMIA: Status: ACTIVE | Noted: 2021-11-29

## 2022-12-08 PROBLEM — R51.9 HEADACHE: Status: ACTIVE | Noted: 2021-04-02

## 2022-12-08 PROBLEM — I49.9 CARDIAC ARRHYTHMIA: Status: ACTIVE | Noted: 2021-09-24

## 2022-12-08 PROBLEM — M51.36 DEGENERATION OF LUMBAR INTERVERTEBRAL DISC: Status: ACTIVE | Noted: 2020-03-19

## 2022-12-08 PROBLEM — I42.9 CARDIOMYOPATHY (HCC): Status: ACTIVE | Noted: 2021-09-24

## 2022-12-08 PROCEDURE — 1036F TOBACCO NON-USER: CPT | Performed by: NURSE PRACTITIONER

## 2022-12-08 PROCEDURE — 99214 OFFICE O/P EST MOD 30 MIN: CPT | Performed by: NURSE PRACTITIONER

## 2022-12-08 PROCEDURE — G8484 FLU IMMUNIZE NO ADMIN: HCPCS | Performed by: NURSE PRACTITIONER

## 2022-12-08 PROCEDURE — G8419 CALC BMI OUT NRM PARAM NOF/U: HCPCS | Performed by: NURSE PRACTITIONER

## 2022-12-08 PROCEDURE — 3017F COLORECTAL CA SCREEN DOC REV: CPT | Performed by: NURSE PRACTITIONER

## 2022-12-08 PROCEDURE — 3023F SPIROM DOC REV: CPT | Performed by: NURSE PRACTITIONER

## 2022-12-08 PROCEDURE — G8427 DOCREV CUR MEDS BY ELIG CLIN: HCPCS | Performed by: NURSE PRACTITIONER

## 2022-12-08 PROCEDURE — 93000 ELECTROCARDIOGRAM COMPLETE: CPT | Performed by: NURSE PRACTITIONER

## 2022-12-08 RX ORDER — METOPROLOL SUCCINATE 50 MG/1
100 TABLET, EXTENDED RELEASE ORAL DAILY
Qty: 180 TABLET | Refills: 3 | Status: SHIPPED | OUTPATIENT
Start: 2022-12-08

## 2022-12-08 NOTE — PROGRESS NOTES
[address]  [place] [facility] Electrophysiology  Outpatient Progress Note  Catherine Sandifer  1960  Date of Service: 12/8/2022  PCP: Enrique Morales DO  Cardiologist: Dr Cathie Archuleta  Electrophysiologist: Dr. Anthony Roberts        Subjective: Catherine Sandifer is seen for follow-up and management of: PVC s/p ablation     Last seen in the office with Dr. Anthony Roberts on 7/28/2022, and Brayden reynolds on 11/3/2022. Status post PVC ablation on 11/15/2022. PMH as noted below significant for PVCs, NYHA class I HFpEF-borderline 2/2 PVC, alcohol abuse (resolved), and cannabis use (resolved). Patient was diagnosed with nonischemic cardiomyopathy in September 2021 and treated with guideline medical therapy with lisinopril and Toprol. In December 2021 he wore a 2-week event monitor which showed 42% PVC burden and frequent NSVT. He stopped drug and alcohol use at that time. He was referred to EP office in July 2022 and underwent a 72-hour 3-lead monitor with continued 43% PVC burden predominantly with single morphology consistent with anterior RVOT and SOL. Echo at that time revealed 45 to 50% ejection fraction. He underwent PVC ablation on 11/15/2022. His EKG on presentation on 11/15 was sinus without PVCs and successful ablation of PVCs was done that day. His 1 week EKG checkup did have rare PVCs noted with same morphology as previous. He states that he does feel somewhat better since the ablation. He was not 100% aware of his PVCs in the past so this is difficult to assess change. He denies significant palpitations before or after the ablation. He does note that he had more fatigue and more shortness of breath prior to ablation. He has been able to go up steps easier and feels he has more energy to do work since ablation. He does admit to noncompliance in twice daily medication dosing and states he forgets the nighttime dose of Toprol most days.   EKG today shows frequent PVCs with same tablet by mouth daily 42 tablet 0    pantoprazole (PROTONIX) 20 MG tablet Take 1 tablet by mouth every morning (before breakfast) 42 tablet 0    MAGnesium-Oxide 400 (240 Mg) MG tablet TAKE 1 TABLET BY MOUTH DAILY 90 tablet 3    metoprolol succinate (TOPROL XL) 50 MG extended release tablet Take 1 tablet by mouth in the morning and 1 tablet before bedtime. 180 tablet 3    tiotropium (SPIRIVA) 18 MCG inhalation capsule Inhale 18 mcg into the lungs in the morning. rosuvastatin (CRESTOR) 10 MG tablet TAKE 1 TABLET BY MOUTH EVERY DAY      lisinopril (PRINIVIL;ZESTRIL) 2.5 MG tablet Take 1 tablet by mouth daily 30 tablet 3    citalopram (CELEXA) 40 MG tablet Take 1 tablet by mouth daily       No current facility-administered medications for this visit. No Known Allergies    ROS:   Constitutional: Negative for fever, activity change and appetite change. HENT: Negative for epistaxis. Eyes: Negative for diploplia, blurred vision. Respiratory: Negative for cough, chest tightness, shortness of breath and wheezing. Cardiovascular: pertinent positives in HPI  Gastrointestinal: Negative for abdominal pain and blood in stool. All other review of systems are negative     PHYSICAL EXAM:      Vitals:    12/08/22 0754   BP: 130/70   Site: Left Upper Arm   Position: Sitting   Cuff Size: Medium Adult   Pulse: (!) 129   Resp: 16   Weight: 210 lb (95.3 kg)   Height: 6' (1.829 m)     Constitutional: well-developed, no acute distress  Eyes: conjunctivae normal, no xanthelasma   Ears, Nose, Throat: oral mucosa moist, no cyanosis   CV: no JVD. Regular rate and rhythm. Normal S1S2 and no S3. No murmurs, rubs, or gallops.  PMI is nondisplaced  Lungs: clear to auscultation bilaterally, normal respiratory effort without used of accessory muscles  Abdomen: soft, non-tender, bowel sounds present, no masses or hepatomegaly   Musculoskeletal: no digital clubbing, no edema   Skin: warm, no rashes     Data:    No results for input(s): WBC, HGB, HCT, PLT in the last 72 hours. No results for input(s): NA, K, CL, CO2, BUN, CREATININE, GLU, CALCIUM in the last 72 hours. Invalid input(s):  MAGNESIUM  Lab Results   Component Value Date/Time    MG 2.2 11/15/2022 07:30 AM     No results for input(s): TSH in the last 72 hours. No results for input(s): INR in the last 72 hours. EKG: SR with frequent PVCs    Please see scan in Cardiology. Assessment and plan:    1. PVCs  - ECG: Likely PASCALE anterior RVOT location.  - TTE: persistent LVEF of 45-50% despite GDMT and EtOH/drug use cessation. -- Event monitor: VE burden = 43% of predominantly single morphology.  -Status post PVC ablation on 11/15/2022, noncompliant with twice daily dosing of metoprolol. Frequent PVCs on EKG today. 2. NYHA Class I HFpEF-borderline/nonischemic 2/2 PVC  -- Management per Dr Nga Casanova. 3.  COPD  -Continued nicotine use with vaping pen, trying to quit smoking    Recommendations:    1. Patient noncompliant with twice daily dosing of metoprolol XL so discussed daily dosing of 100 mg rather than 50 mg twice daily since he misses the 50 mg in the evening quite often. 2.  Repeat 72-hour 3-lead monitor to assess burden on higher dose of Toprol  3. If PVC burden is less than 42%, repeat echocardiogram in 2 to 3 months  4. Office visit in 3 months or sooner depending on above results. Mychart and call     Re-education on importance of well controlled HTN (goal BP < 130/80), adequate weight control (goal BMI of < 27), physical activity consisting of moderate cardiopulmonary exercise up to a goal of 250 min/wk, smoking/tobacco abstinence and limited ETOH intake. I have spent a total of 30 minutes with the patient and the family reviewing the above stated recommendations. And a total of >50% of that time involved face-to-face time providing counseling and or coordination of care with the other providers.       Thank you for allowing me to participate in your patient's care. Please call me if there are any questions or concerns.         KAPIL Russ - CNP  Cardiac Electrophysiology  [facility]) Physicians  [facility]: [place] [facility]  8:10 AM  12/8/2022

## 2022-12-08 NOTE — PROGRESS NOTES
3 Lead [employer] monitor mailed to patient. Instructions given and patient will call with any questions/issues.

## 2023-01-09 DIAGNOSIS — I49.3 PVC (PREMATURE VENTRICULAR CONTRACTION): ICD-10-CM

## 2023-01-27 DIAGNOSIS — I49.3 PVC (PREMATURE VENTRICULAR CONTRACTION): Primary | ICD-10-CM

## 2023-01-27 RX ORDER — METOPROLOL SUCCINATE 100 MG/1
100 TABLET, EXTENDED RELEASE ORAL DAILY
COMMUNITY
End: 2023-01-27 | Stop reason: SDUPTHER

## 2023-01-27 RX ORDER — METOPROLOL TARTRATE 100 MG/1
100 TABLET ORAL DAILY
COMMUNITY
End: 2023-01-27

## 2023-01-27 RX ORDER — METOPROLOL SUCCINATE 50 MG/1
50 TABLET, EXTENDED RELEASE ORAL DAILY
Qty: 90 TABLET | Refills: 3 | Status: SHIPPED | OUTPATIENT
Start: 2023-01-27

## 2023-01-27 RX ORDER — METOPROLOL SUCCINATE 100 MG/1
100 TABLET, EXTENDED RELEASE ORAL DAILY
Qty: 90 TABLET | Refills: 3 | Status: SHIPPED | OUTPATIENT
Start: 2023-01-27

## 2023-01-27 NOTE — TELEPHONE ENCOUNTER
----- Message from Gris Mitchell DO sent at 1/24/2023  9:54 PM EST -----  Regarding: BB, echo, appt, ? redo PVC  Patient has hx of NICM 2/2 PVC. Prior to ablation VE burden was ~43%. Ablation initially successful, but appears PVC burden is progressively increasing on review of ECG and recent cardiac monitor. VE associated with symptoms. Recommend increase metoprolol XL from 100 mg daily to 150 mg daily. Recommend repeat echo in late 2/2023. I will ask marty to schedule appt with me in early 3/2023 to discuss repeat PVC ablation. Will likely map both right and left outflow tracts given recurrence after initially successful ablation.    -Gris Mitchell DO   ----- Message -----  From: KAPIL Pisano - DARIUS  Sent: 1/23/2023   1:33 PM EST  To: Gris Mitchell DO    VE burden = 19.87% on repeat monitor 72 hours. Do you think he needs a repeat echo or any other changes?    Thank you   Harden Cones

## 2023-01-27 NOTE — TELEPHONE ENCOUNTER
I spoke to Kusum Solis and he will increase his metoprolol to 150mg daily . A script will be sent to his pharmacy. He is scheduled w/ TB in March to discuss repeat PVC ablation and echo order placed and faxed downstairs to be scheduled.

## 2023-02-14 ENCOUNTER — TELEPHONE (OUTPATIENT)
Dept: CARDIOLOGY | Age: 63
End: 2023-02-14

## 2023-03-02 ENCOUNTER — TELEPHONE (OUTPATIENT)
Dept: CARDIOLOGY | Age: 63
End: 2023-03-02

## 2023-03-02 NOTE — TELEPHONE ENCOUNTER
SPOKE WITH PATIENT TO CANCEL ECHO THAT WAS SCHEDULED FOR 03-03-23. DUE TO INS PENDING. RESCHEDULED FOR 03-16-23.     Electronically signed by Randy Cain on 3/2/2023 at 1:38 PM

## 2023-03-16 ENCOUNTER — OFFICE VISIT (OUTPATIENT)
Dept: NON INVASIVE DIAGNOSTICS | Age: 63
End: 2023-03-16
Payer: COMMERCIAL

## 2023-03-16 VITALS
HEIGHT: 72 IN | HEART RATE: 61 BPM | WEIGHT: 205 LBS | BODY MASS INDEX: 27.77 KG/M2 | DIASTOLIC BLOOD PRESSURE: 80 MMHG | SYSTOLIC BLOOD PRESSURE: 122 MMHG

## 2023-03-16 DIAGNOSIS — I49.3 PVC (PREMATURE VENTRICULAR CONTRACTION): Primary | ICD-10-CM

## 2023-03-16 PROCEDURE — 99214 OFFICE O/P EST MOD 30 MIN: CPT | Performed by: STUDENT IN AN ORGANIZED HEALTH CARE EDUCATION/TRAINING PROGRAM

## 2023-03-16 PROCEDURE — 3017F COLORECTAL CA SCREEN DOC REV: CPT | Performed by: STUDENT IN AN ORGANIZED HEALTH CARE EDUCATION/TRAINING PROGRAM

## 2023-03-16 PROCEDURE — G8419 CALC BMI OUT NRM PARAM NOF/U: HCPCS | Performed by: STUDENT IN AN ORGANIZED HEALTH CARE EDUCATION/TRAINING PROGRAM

## 2023-03-16 PROCEDURE — 1036F TOBACCO NON-USER: CPT | Performed by: STUDENT IN AN ORGANIZED HEALTH CARE EDUCATION/TRAINING PROGRAM

## 2023-03-16 PROCEDURE — 93000 ELECTROCARDIOGRAM COMPLETE: CPT | Performed by: STUDENT IN AN ORGANIZED HEALTH CARE EDUCATION/TRAINING PROGRAM

## 2023-03-16 PROCEDURE — G8427 DOCREV CUR MEDS BY ELIG CLIN: HCPCS | Performed by: STUDENT IN AN ORGANIZED HEALTH CARE EDUCATION/TRAINING PROGRAM

## 2023-03-16 PROCEDURE — G8484 FLU IMMUNIZE NO ADMIN: HCPCS | Performed by: STUDENT IN AN ORGANIZED HEALTH CARE EDUCATION/TRAINING PROGRAM

## 2023-03-16 NOTE — PATIENT INSTRUCTIONS
No medication changes today. Wear cardiac monitor for 3 days. Return via mail. Dr Kenny Salter office will contact insurance company regarding echocardiogram.  Follow-up with Dr Kenny Salter office in ~3 months.

## 2023-03-16 NOTE — PROGRESS NOTES
[address] ELECTROPHYSIOLOGY DEPARTMENT/DIVISION OF CARDIOLOGY  H&P Report  PATIENT: Sen Nayak  MEDICAL RECORD NUMBER: [id number]  DATE OF SERVICE:  3/16/2023  ATTENDING ELECTROPHYSIOLOGIST:  Antonio Angulo D.O.  REFERRING PHYSICIAN: No ref. provider found and Jarek Alcantar DO  CHIEF COMPLAINT: PVC    HPI: Sen Nayak is a 60 y.o. male with a history of anterior RVOT PVC sp RFA (11/15/2022-Dr. Luis Grider), NYHA class I HFpEF-borderline 2/2 PVC, alcohol abuse (resolved), and cannabis use (resolved). He is managed by Dr. Tho Waters with lisinopril 2.5 mg daily, metoprolol  mg daily, and rosuvastatin 10 mg daily. In 2021, he was diagnosed with NICM (LVEF = 45-50%) 2/2 PVC, which was treated with GDMT. In 2021, a 2 week event monitor reproted VE burden of 42% and frequent NSVT. He stopped EtOH/durg use. In 2022, he was referred to my office and reported cessation of EtOH/drug use. ECG consistent with anterior RVOT PASCALE. A 72 hour 3 lead monitor reported VE burden of 43% of predominantly single morphology and a TTE reported persistent LVEF of 45-50%. In 2022, PVC ablation performed at site of earliest activation) 30 ms pre-QRS) on anterior RVOT. In 2022, he was noted to have recurrence of PVCs on ECG at time of office visit. Repeat cardiac monitor reported VE burden of 19% with 3 morphologies of relatively equal distribution. He was reportedly noncompliant with metoprolol around this time, so metoprolol compliance encouraged. He presents today, 3/16/2023; for follow-up with my office. He denies any complaints at this time.     Prior cardiac testin hour event monitor (22): sinus at 64 - 114 bpm (mean: 84 bpm), 18 patient events during PVC and NSVT, SVE burden < 1%, VE burden = 19.87% (3 morphologies, 1st morphology 44.43%, 2nd morphology 42.35%, and 3rd morphology 13.22%), 3,997 episodes of NSVT (longest: 7 beats at 187 bpm), and no AF, SVT, AV block, or significant pauses. Limited TTE: 9/15/2022: LVEF 45-50%, mild MR, trace TR, LVH 1.5cm  72 hour event monitor (22): SR at 65 - 110 bpm (mean: 101 bpm),1 patient event during SR with runs of VE, SVE burden = 0%, VE burden = 42.95% (5 morphologies, 98.08% single morphology), 12,021 runs of VE (longest: 7 beats; fastest: 248 bpm), no AF, SVT, 2* type II or 3* AV block, or significant pauses  EC22: SR 84 bpm, PVCs (likely anterior RVOT)  14 day event monitor (21): SR at  bpm (mean: 86 bpm), SVE burden less than 1%, 230 episodes of asymptomatic and nonsustained SVT (longest: 16 beats at 167 bpm; fastest 200 bpm for 6 beats), VE burden approximately 42%, approximately 5000 episodes of asymptomatic and nonsustained VT (longest: 11 beats at 180 bpm; fastest: 245 bpm for 5 beats), no patient events, no AF, second-degree type II or third-degree AV block, or significant pauses. LHC (9/10/2021): RCA dominant system, no significant CAD. TTE (9/10/2021): LVEF = 45-50%, stage I LVDD. Exercise nuclear stress test (2021): No ischemic ECG changes at 86% of APM HR, frequent PVCs during exercise, infarct of mid-distal inferior septum and anterior septum with minimal homa-infarct ischemia, no transient ischemic dilation, below average exercise capacity for age/sex. Past Medical History:   Diagnosis Date    Chronic obstructive lung disease ([address] 2021    Chronic systolic (congestive) heart failure ([address] 9/10/2021    Degeneration of lumbar intervertebral disc 3/19/2020    Mixed hyperlipidemia 2021     Past Surgical History:   Procedure Laterality Date    ABLATION OF DYSRHYTHMIC FOCUS  11/15/2022    PVC  (Dr. Brea Calvillo)        History reviewed. No pertinent family history.   There is no family history of sudden cardiac arrest    Social History     Tobacco Use    Smoking status: Former     Packs/day: 1.50     Years: 40.00     Pack years: 60.00     Types: Cigarettes     Quit date: 2022 Years since quittin.7    Smokeless tobacco: Never   Substance Use Topics    Alcohol use: Yes     Comment: 1 beer/day       Current Outpatient Medications   Medication Sig Dispense Refill    metoprolol succinate (TOPROL XL) 100 MG extended release tablet Take 1 tablet by mouth daily 90 tablet 3    metoprolol succinate (TOPROL XL) 50 MG extended release tablet Take 1 tablet by mouth daily 90 tablet 3    MAGnesium-Oxide 400 (240 Mg) MG tablet TAKE 1 TABLET BY MOUTH DAILY 90 tablet 3    tiotropium (SPIRIVA) 18 MCG inhalation capsule Inhale 18 mcg into the lungs in the morning. rosuvastatin (CRESTOR) 10 MG tablet TAKE 1 TABLET BY MOUTH EVERY DAY      lisinopril (PRINIVIL;ZESTRIL) 2.5 MG tablet Take 1 tablet by mouth daily 30 tablet 3    citalopram (CELEXA) 40 MG tablet Take 1 tablet by mouth daily       No current facility-administered medications for this visit. No Known Allergies    ROS:   Constitutional: Negative for fever, activity change and appetite change. HENT: Negative for epistaxis. Eyes: Negative for diploplia, blurred vision. Respiratory: Negative for cough, chest tightness, shortness of breath and wheezing. Cardiovascular: pertinent positives in HPI  Gastrointestinal: Negative for abdominal pain and blood in stool. Genitourinary: Negative for hematuria and difficulty urinating. Musculoskeletal: Negative for myalgias and gait problem. Skin: Negative for color change and rash. Neurological: Negative for syncope and light-headedness. Psychiatric/Behavioral: Negative for confusion and agitation. The patient is not nervous/anxious.   Heme: no bleeding disorders, no melena or hematochezia  All other review of systems are negative     PHYSICAL EXAM:  /80   Pulse 61   Ht 6' (1.829 m)   Wt 205 lb (93 kg)   BMI 27.80 kg/m²     Constitutional: Well-developed, no acute distress, well groomed  Eyes: conjunctivae normal, no xanthelasma   Ears, Nose, Throat: oral mucosa moist, no cyanosis   Neck: supple, no JVD, no thyromegaly   CV: normal rate, regular rhythm,  no murmurs, rubs, or gallops. PMI is nondisplaced, Peripheral pulses normal including carotid auscultation, no noted aortic bruit, bilateral femoral and pedal pulses are normal in quality  Lungs: clear to auscultation bilaterally, normal respiratory effort without used of accessory muscles, no wheezes  Abdomen: soft, non-tender, bowel sounds present, no masses or hepatomegaly   Extremities: no digital clubbing, no edema   Skin: warm, no rashes   Neuro/Psych: A&O x 3, normal mood and affect    Cardiac testing done today:   ECG (3/16/2023): Sinus at 61 bpm, artifact      Assessment/plan:  1. Anterior RVOT PVC sp RFA (11/15/2022-Dr. Jayashree Lopez)  -Patient diagnosed with LVEF of 45-50% with VE burden of 43% of predominantly single morphology. Baseline ECG, likely PASCALE anterior RVOT location. -PVC ablation in 11/2022: 30 ms pre-QRS anterior RVOT site, ablation terminated PVCs. -ECG 11/23/2022: Recurrence of PVCs observed. - 72-hour cardiac monitor 1/9/2023: VE burden of 19% with 3 morphologies of relatively equal distribution.  - No PVCs on ECG today. It is possible that the inflammation process following the ablation may have resulted in some ventricular ectopy, which is now resolved. Recommend 3-day cardiac monitor to reassess.   -Continue metoprolol  mg daily.    - Follow-up with my office in 3 months. 2. NYHA Class I HFpEF-borderline/nonischemic 2/2 PVC  -- Management per Dr Karen Hamilton. -- TTE 3 months after ablation was reportedly denied by the insurance company. I have messaged my office staff to try to resolve this issue. Thank you for allowing me to participate in your patient's care. Please call me if there are any questions.       I spent a total of 40 minutes reviewing previous notes, test results, and face to face with the patient discussing the diagnosis and importance of compliance with the treatment plan as well as documenting on the day of the visit. Time of the day of service includes:  Preparing to see the patient (eg. Review of the medical record, such as tests). Obtaining and/or reviewing separately obtained history. Ordering prescription medications, tests, and/or procedures. Communicating results to the patient/family/caregiver. Counseling/educating the patient/family/caregiver. Documenting clinical information in the patients electronic record. Coordination of care for the patient. Performing a medical appropriate exam and/or evaluation.       Marita Roman DO on 3/16/2023 at 11:33 AM  Cardiac Electrophysiology  [address] [place] [address]

## 2023-03-31 DIAGNOSIS — I49.3 PVC (PREMATURE VENTRICULAR CONTRACTION): ICD-10-CM

## 2023-04-03 ENCOUNTER — TELEPHONE (OUTPATIENT)
Dept: NON INVASIVE DIAGNOSTICS | Age: 63
End: 2023-04-03

## 2023-04-03 NOTE — TELEPHONE ENCOUNTER
----- Message from Casandra Carlson DO sent at 4/2/2023 10:16 PM EDT -----  Regarding: monitor  Monitor now reports ventricular ectopy (VE) burden of 4.1% of predominantly single morphology. Appears the ablation was successful in significantly reducing VE burden (previously 43%).     Casandra Carlson DO

## 2023-04-03 NOTE — TELEPHONE ENCOUNTER
Pt notified of results and verbalized understanding.     Electronically signed by Dannie Blanco MA on 4/3/2023 at 9:30 AM

## 2023-06-19 ENCOUNTER — OFFICE VISIT (OUTPATIENT)
Dept: NON INVASIVE DIAGNOSTICS | Age: 63
End: 2023-06-19
Payer: COMMERCIAL

## 2023-06-19 VITALS
DIASTOLIC BLOOD PRESSURE: 80 MMHG | SYSTOLIC BLOOD PRESSURE: 102 MMHG | HEART RATE: 75 BPM | BODY MASS INDEX: 28.17 KG/M2 | WEIGHT: 208 LBS | HEIGHT: 72 IN

## 2023-06-19 DIAGNOSIS — I50.22 CHRONIC SYSTOLIC (CONGESTIVE) HEART FAILURE (HCC): ICD-10-CM

## 2023-06-19 DIAGNOSIS — Z98.890 S/P ABLATION OF VENTRICULAR ARRHYTHMIA: ICD-10-CM

## 2023-06-19 DIAGNOSIS — I49.3 PVC (PREMATURE VENTRICULAR CONTRACTION): Primary | ICD-10-CM

## 2023-06-19 DIAGNOSIS — Z86.79 S/P ABLATION OF VENTRICULAR ARRHYTHMIA: ICD-10-CM

## 2023-06-19 PROCEDURE — G8427 DOCREV CUR MEDS BY ELIG CLIN: HCPCS | Performed by: NURSE PRACTITIONER

## 2023-06-19 PROCEDURE — 99214 OFFICE O/P EST MOD 30 MIN: CPT | Performed by: NURSE PRACTITIONER

## 2023-06-19 PROCEDURE — 1036F TOBACCO NON-USER: CPT | Performed by: NURSE PRACTITIONER

## 2023-06-19 PROCEDURE — 3017F COLORECTAL CA SCREEN DOC REV: CPT | Performed by: NURSE PRACTITIONER

## 2023-06-19 PROCEDURE — G8419 CALC BMI OUT NRM PARAM NOF/U: HCPCS | Performed by: NURSE PRACTITIONER

## 2023-06-19 RX ORDER — ALBUTEROL SULFATE 90 UG/1
AEROSOL, METERED RESPIRATORY (INHALATION)
COMMUNITY
Start: 2023-06-01

## 2024-03-19 RX ORDER — LANOLIN ALCOHOL/MO/W.PET/CERES
CREAM (GRAM) TOPICAL
Qty: 90 TABLET | Refills: 1 | Status: SHIPPED | OUTPATIENT
Start: 2024-03-19

## 2024-03-19 RX ORDER — METOPROLOL SUCCINATE 100 MG/1
100 TABLET, EXTENDED RELEASE ORAL DAILY
Qty: 90 TABLET | Refills: 1 | Status: SHIPPED | OUTPATIENT
Start: 2024-03-19

## 2024-03-19 RX ORDER — METOPROLOL SUCCINATE 50 MG/1
50 TABLET, EXTENDED RELEASE ORAL DAILY
Qty: 90 TABLET | Refills: 1 | Status: SHIPPED | OUTPATIENT
Start: 2024-03-19

## 2024-08-29 ENCOUNTER — TELEPHONE (OUTPATIENT)
Dept: NON INVASIVE DIAGNOSTICS | Age: 64
End: 2024-08-29

## 2024-08-29 DIAGNOSIS — R00.2 PALPITATIONS: Primary | ICD-10-CM

## 2024-08-29 NOTE — TELEPHONE ENCOUNTER
Patient called in and stated his heart rate has been in the 30's and 40's; complains of sob, dizzy (woozy feeling).  Patient stated he checked his hr on his watch when he is not feeling well. Patient has an appt on 9/26/24 with Dr La.

## 2024-08-30 ENCOUNTER — NURSE ONLY (OUTPATIENT)
Dept: NON INVASIVE DIAGNOSTICS | Age: 64
End: 2024-08-30

## 2024-08-30 NOTE — TELEPHONE ENCOUNTER
Patient notified and agreed to wear 7 day heart monitor. He is coming in today around 2:30 PM to have it placed.     Electronically signed by Diana Velazquez MA on 8/30/2024 at 11:12 AM

## 2024-08-30 NOTE — PROGRESS NOTES
Patient was seen today and ZIO XT 7 day monitor was placed.  Monitor was ordered by Dr Royce La.  Monitor was applied and instructions given to patient.  Patient stated understanding and gave verbalized readback.    Monitor company: [employer]T 7 day  Serial number : [id number]    Electronically signed by GABBY ARMSTRONG MA on 8/30/2024 at 2:40 PM

## 2024-09-09 ENCOUNTER — HOSPITAL ENCOUNTER (OUTPATIENT)
Dept: CT IMAGING | Age: 64
Discharge: HOME | End: 2024-09-11
Payer: COMMERCIAL

## 2024-09-09 DIAGNOSIS — F17.200 SMOKER: ICD-10-CM

## 2024-09-09 PROCEDURE — 71271 CT THORAX LUNG CANCER SCR C-: CPT

## 2024-09-11 RX ORDER — METOPROLOL SUCCINATE 50 MG/1
50 TABLET, EXTENDED RELEASE ORAL DAILY
Qty: 90 TABLET | Refills: 1 | Status: SHIPPED | OUTPATIENT
Start: 2024-09-11

## 2024-09-11 RX ORDER — LANOLIN ALCOHOL/MO/W.PET/CERES
CREAM (GRAM) TOPICAL
Qty: 90 TABLET | Refills: 1 | Status: SHIPPED | OUTPATIENT
Start: 2024-09-11

## 2024-09-11 RX ORDER — METOPROLOL SUCCINATE 100 MG/1
100 TABLET, EXTENDED RELEASE ORAL DAILY
Qty: 90 TABLET | Refills: 1 | Status: SHIPPED | OUTPATIENT
Start: 2024-09-11

## 2024-09-16 ENCOUNTER — TELEPHONE (OUTPATIENT)
Dept: NON INVASIVE DIAGNOSTICS | Age: 64
End: 2024-09-16

## 2024-09-16 ENCOUNTER — TELEPHONE (OUTPATIENT)
Dept: CARDIOLOGY | Age: 64
End: 2024-09-16

## 2024-09-16 DIAGNOSIS — I49.3 PVC (PREMATURE VENTRICULAR CONTRACTION): Primary | ICD-10-CM

## 2024-09-16 DIAGNOSIS — R00.2 PALPITATIONS: ICD-10-CM

## 2024-09-26 ENCOUNTER — OFFICE VISIT (OUTPATIENT)
Dept: NON INVASIVE DIAGNOSTICS | Age: 64
End: 2024-09-26
Payer: COMMERCIAL

## 2024-09-26 ENCOUNTER — TELEPHONE (OUTPATIENT)
Dept: NON INVASIVE DIAGNOSTICS | Age: 64
End: 2024-09-26

## 2024-09-26 VITALS
RESPIRATION RATE: 18 BRPM | HEART RATE: 110 BPM | WEIGHT: 196 LBS | SYSTOLIC BLOOD PRESSURE: 108 MMHG | BODY MASS INDEX: 27.44 KG/M2 | HEIGHT: 71 IN | DIASTOLIC BLOOD PRESSURE: 66 MMHG

## 2024-09-26 DIAGNOSIS — I49.3 PVC (PREMATURE VENTRICULAR CONTRACTION): Primary | ICD-10-CM

## 2024-09-26 DIAGNOSIS — I49.3 PVC (PREMATURE VENTRICULAR CONTRACTION): ICD-10-CM

## 2024-09-26 LAB
ALBUMIN: 4.3 G/DL (ref 3.5–5.2)
ALP BLD-CCNC: 96 U/L (ref 40–129)
ALT SERPL-CCNC: 12 U/L (ref 0–40)
ANION GAP SERPL CALCULATED.3IONS-SCNC: 12 MMOL/L (ref 7–16)
AST SERPL-CCNC: 13 U/L (ref 0–39)
BILIRUB SERPL-MCNC: 0.4 MG/DL (ref 0–1.2)
BUN BLDV-MCNC: 21 MG/DL (ref 6–23)
CALCIUM SERPL-MCNC: 9.5 MG/DL (ref 8.6–10.2)
CHLORIDE BLD-SCNC: 105 MMOL/L (ref 98–107)
CO2: 22 MMOL/L (ref 22–29)
CREAT SERPL-MCNC: 1.2 MG/DL (ref 0.7–1.2)
GFR, ESTIMATED: 70 ML/MIN/1.73M2
GLUCOSE BLD-MCNC: 110 MG/DL (ref 74–99)
HCT VFR BLD CALC: 42.9 % (ref 37–54)
HEMOGLOBIN: 14.2 G/DL (ref 12.5–16.5)
MAGNESIUM: 2 MG/DL (ref 1.6–2.6)
MCH RBC QN AUTO: 32.2 PG (ref 26–35)
MCHC RBC AUTO-ENTMCNC: 33.1 G/DL (ref 32–34.5)
MCV RBC AUTO: 97.3 FL (ref 80–99.9)
PDW BLD-RTO: 14.8 % (ref 11.5–15)
PLATELET # BLD: 316 K/UL (ref 130–450)
PMV BLD AUTO: 10.6 FL (ref 7–12)
POTASSIUM SERPL-SCNC: 5.4 MMOL/L (ref 3.5–5)
RBC # BLD: 4.41 M/UL (ref 3.8–5.8)
SODIUM BLD-SCNC: 139 MMOL/L (ref 132–146)
TOTAL PROTEIN: 7.4 G/DL (ref 6.4–8.3)
TSH SERPL DL<=0.05 MIU/L-ACNC: 1.98 UIU/ML (ref 0.27–4.2)
WBC # BLD: 11.6 K/UL (ref 4.5–11.5)

## 2024-09-26 PROCEDURE — 99215 OFFICE O/P EST HI 40 MIN: CPT | Performed by: STUDENT IN AN ORGANIZED HEALTH CARE EDUCATION/TRAINING PROGRAM

## 2024-09-26 PROCEDURE — 3017F COLORECTAL CA SCREEN DOC REV: CPT | Performed by: STUDENT IN AN ORGANIZED HEALTH CARE EDUCATION/TRAINING PROGRAM

## 2024-09-26 PROCEDURE — 93000 ELECTROCARDIOGRAM COMPLETE: CPT | Performed by: STUDENT IN AN ORGANIZED HEALTH CARE EDUCATION/TRAINING PROGRAM

## 2024-09-26 PROCEDURE — G8427 DOCREV CUR MEDS BY ELIG CLIN: HCPCS | Performed by: STUDENT IN AN ORGANIZED HEALTH CARE EDUCATION/TRAINING PROGRAM

## 2024-09-26 PROCEDURE — 1036F TOBACCO NON-USER: CPT | Performed by: STUDENT IN AN ORGANIZED HEALTH CARE EDUCATION/TRAINING PROGRAM

## 2024-09-26 PROCEDURE — G8419 CALC BMI OUT NRM PARAM NOF/U: HCPCS | Performed by: STUDENT IN AN ORGANIZED HEALTH CARE EDUCATION/TRAINING PROGRAM

## 2024-09-26 RX ORDER — TOPIRAMATE 25 MG/1
25 TABLET, FILM COATED ORAL 2 TIMES DAILY
COMMUNITY

## 2024-09-26 RX ORDER — RIZATRIPTAN BENZOATE 10 MG/1
10 TABLET ORAL
COMMUNITY

## 2024-09-26 RX ORDER — FREMANEZUMAB-VFRM 225 MG/1.5ML
INJECTION SUBCUTANEOUS
COMMUNITY

## 2024-09-26 RX ORDER — METOPROLOL SUCCINATE 100 MG/1
100 TABLET, EXTENDED RELEASE ORAL EVERY 12 HOURS
Qty: 180 TABLET | Refills: 1 | Status: SHIPPED | OUTPATIENT
Start: 2024-09-26 | End: 2024-12-25

## 2024-10-11 ENCOUNTER — TELEPHONE (OUTPATIENT)
Dept: CARDIOLOGY | Age: 64
End: 2024-10-11

## 2024-10-11 NOTE — TELEPHONE ENCOUNTER
Received denial from [facility] for the echocardiogram for Mr. Montanez. The denial is scanned into media for your review. Unsure if there was another echo done as requested by Dr. Christopher Velez (?). Please advise. Thank you.

## 2024-10-21 ENCOUNTER — OFFICE VISIT (OUTPATIENT)
Dept: ENT CLINIC | Age: 64
End: 2024-10-21

## 2024-10-21 ENCOUNTER — PROCEDURE VISIT (OUTPATIENT)
Dept: AUDIOLOGY | Age: 64
End: 2024-10-21
Payer: COMMERCIAL

## 2024-10-21 VITALS
BODY MASS INDEX: 27.3 KG/M2 | HEART RATE: 70 BPM | DIASTOLIC BLOOD PRESSURE: 80 MMHG | WEIGHT: 195 LBS | SYSTOLIC BLOOD PRESSURE: 115 MMHG | HEIGHT: 71 IN

## 2024-10-21 DIAGNOSIS — R42 DIZZINESS: Primary | ICD-10-CM

## 2024-10-21 DIAGNOSIS — J34.9 SINUS DISEASE: ICD-10-CM

## 2024-10-21 PROCEDURE — 92567 TYMPANOMETRY: CPT

## 2024-10-21 PROCEDURE — 92557 COMPREHENSIVE HEARING TEST: CPT

## 2024-10-21 RX ORDER — ROSUVASTATIN CALCIUM 20 MG/1
20 TABLET, COATED ORAL DAILY
COMMUNITY
Start: 2024-09-25

## 2024-10-21 RX ORDER — FLUTICASONE PROPIONATE 50 MCG
2 SPRAY, SUSPENSION (ML) NASAL DAILY
Qty: 16 G | Refills: 5 | Status: SHIPPED | OUTPATIENT
Start: 2024-10-21

## 2024-10-21 NOTE — PROGRESS NOTES
This patient was referred for audiometric and tympanometric testing by Dr. Moss due to dizziness.     Audiometry using pure tone air and bone conduction testing revealed hearing sensitivity within normal limits, bilaterally. Reliability was good. Speech reception thresholds were in good agreement with the pure tone averages, bilaterally. Speech discrimination scores were excellent, bilaterally.    Tympanometry revealed normal middle ear peak pressure and compliance, bilaterally.    The results were reviewed with the patient and ordering provider.     Recommendations for follow up will be made pending ordering provider consult.    Ann Call/CCC-A  [place] Lic [id number]  Electronically signed by Ann Call on 10/21/2024 at 2:32 PM

## 2024-10-21 NOTE — PROGRESS NOTES
loss.   Tympanogram completed today resulted in A curves b/l.  Interpreted by myself and reviewed as above  Recommend starting patient on Flonase for chronic rhinitis allergic congestion postnasal drainage  Ordered CT sinus wo contrast rule any obstructive or longstanding chronic sinus disease.      Follow up after CT scan      RX given today:  Flonase

## 2024-10-28 ENCOUNTER — HOSPITAL ENCOUNTER (OUTPATIENT)
Dept: CT IMAGING | Age: 64
Discharge: HOME | End: 2024-10-30
Payer: COMMERCIAL

## 2024-10-28 DIAGNOSIS — J34.9 SINUS DISEASE: ICD-10-CM

## 2024-10-28 PROCEDURE — 70486 CT MAXILLOFACIAL W/O DYE: CPT

## 2024-10-30 ENCOUNTER — CLINICAL DOCUMENTATION (OUTPATIENT)
Dept: NON INVASIVE DIAGNOSTICS | Age: 64
End: 2024-10-30

## 2024-10-30 NOTE — PROGRESS NOTES
Received denial of echocardiogram with tracking #[id number].    On 10/24/2024, I called [facility] and transferred to [facility] to discuss appeal.  I was told this will be denied at this time due to other order being open at another facility for same test.  Per the chart it was noted that patient had an echocardiogram scheduled on 10/18/2024 at [facility].  [facility] was called and stated they had no such order for an echocardiogram and 1 was not done on that date.  They did have a Holter monitor which was ordered but not completed yet.  Approved order for echo at [facility] shows approval expires after 2024.    10/30/2024: Called again to insurance company for any updates and  I was told that I can call and have [facility] call insurance company and state they will cancel the order and send them a letter confirming this.  Otherwise, I can reopen the appeal after 2024 since the above order will .  Since it is doubtful that [facility] will send a letter to cancel an echocardiogram that they would do rather than us, I will wait after 2024 and open the appeal.    Shalini Hyman, APRN - CNP

## 2024-11-25 ENCOUNTER — TELEPHONE (OUTPATIENT)
Dept: NON INVASIVE DIAGNOSTICS | Age: 64
End: 2024-11-25

## 2024-11-25 ENCOUNTER — OFFICE VISIT (OUTPATIENT)
Dept: ENT CLINIC | Age: 64
End: 2024-11-25
Payer: COMMERCIAL

## 2024-11-25 ENCOUNTER — HOSPITAL ENCOUNTER (OUTPATIENT)
Age: 64
Discharge: HOME | End: 2024-11-25
Payer: COMMERCIAL

## 2024-11-25 VITALS
HEART RATE: 60 BPM | WEIGHT: 202 LBS | SYSTOLIC BLOOD PRESSURE: 134 MMHG | OXYGEN SATURATION: 96 % | RESPIRATION RATE: 18 BRPM | TEMPERATURE: 96.9 F | BODY MASS INDEX: 27.36 KG/M2 | DIASTOLIC BLOOD PRESSURE: 78 MMHG | HEIGHT: 72 IN

## 2024-11-25 DIAGNOSIS — J34.89 MAXILLARY SINUS MASS: ICD-10-CM

## 2024-11-25 DIAGNOSIS — J32.0 CHRONIC MAXILLARY SINUSITIS: Primary | ICD-10-CM

## 2024-11-25 DIAGNOSIS — I49.3 PVC (PREMATURE VENTRICULAR CONTRACTION): ICD-10-CM

## 2024-11-25 LAB
ALBUMIN SERPL-MCNC: 4 G/DL (ref 3.5–5.2)
ALP SERPL-CCNC: 103 U/L (ref 40–129)
ALT SERPL-CCNC: 12 U/L (ref 0–40)
ANION GAP SERPL CALCULATED.3IONS-SCNC: 7 MMOL/L (ref 7–16)
AST SERPL-CCNC: 15 U/L (ref 0–39)
BASOPHILS # BLD: 0.05 K/UL (ref 0–0.2)
BASOPHILS NFR BLD: 1 % (ref 0–2)
BILIRUB SERPL-MCNC: 0.4 MG/DL (ref 0–1.2)
BUN SERPL-MCNC: 12 MG/DL (ref 6–23)
CALCIUM SERPL-MCNC: 9.5 MG/DL (ref 8.6–10.2)
CHLORIDE SERPL-SCNC: 105 MMOL/L (ref 98–107)
CO2 SERPL-SCNC: 28 MMOL/L (ref 22–29)
CREAT SERPL-MCNC: 0.9 MG/DL (ref 0.7–1.2)
EOSINOPHIL # BLD: 0.18 K/UL (ref 0.05–0.5)
EOSINOPHILS RELATIVE PERCENT: 2 % (ref 0–6)
ERYTHROCYTE [DISTWIDTH] IN BLOOD BY AUTOMATED COUNT: 13 % (ref 11.5–15)
GFR, ESTIMATED: >90 ML/MIN/1.73M2
GLUCOSE SERPL-MCNC: 113 MG/DL (ref 74–99)
HCT VFR BLD AUTO: 42.1 % (ref 37–54)
HGB BLD-MCNC: 13.8 G/DL (ref 12.5–16.5)
IMM GRANULOCYTES # BLD AUTO: <0.03 K/UL (ref 0–0.58)
IMM GRANULOCYTES NFR BLD: 0 % (ref 0–5)
LYMPHOCYTES NFR BLD: 2.03 K/UL (ref 1.5–4)
LYMPHOCYTES RELATIVE PERCENT: 23 % (ref 20–42)
MAGNESIUM SERPL-MCNC: 2.1 MG/DL (ref 1.6–2.6)
MCH RBC QN AUTO: 31.6 PG (ref 26–35)
MCHC RBC AUTO-ENTMCNC: 32.8 G/DL (ref 32–34.5)
MCV RBC AUTO: 96.3 FL (ref 80–99.9)
MONOCYTES NFR BLD: 0.74 K/UL (ref 0.1–0.95)
MONOCYTES NFR BLD: 9 % (ref 2–12)
NEUTROPHILS NFR BLD: 65 % (ref 43–80)
NEUTS SEG NFR BLD: 5.73 K/UL (ref 1.8–7.3)
PLATELET # BLD AUTO: 263 K/UL (ref 130–450)
PMV BLD AUTO: 9 FL (ref 7–12)
POTASSIUM SERPL-SCNC: 4.8 MMOL/L (ref 3.5–5)
PROT SERPL-MCNC: 7.2 G/DL (ref 6.4–8.3)
RBC # BLD AUTO: 4.37 M/UL (ref 3.8–5.8)
SODIUM SERPL-SCNC: 140 MMOL/L (ref 132–146)
WBC OTHER # BLD: 8.8 K/UL (ref 4.5–11.5)

## 2024-11-25 PROCEDURE — 1123F ACP DISCUSS/DSCN MKR DOCD: CPT | Performed by: OTOLARYNGOLOGY

## 2024-11-25 PROCEDURE — 99214 OFFICE O/P EST MOD 30 MIN: CPT | Performed by: OTOLARYNGOLOGY

## 2024-11-25 PROCEDURE — 83735 ASSAY OF MAGNESIUM: CPT

## 2024-11-25 PROCEDURE — 36415 COLL VENOUS BLD VENIPUNCTURE: CPT

## 2024-11-25 PROCEDURE — 80053 COMPREHEN METABOLIC PANEL: CPT

## 2024-11-25 PROCEDURE — 85025 COMPLETE CBC W/AUTO DIFF WBC: CPT

## 2024-11-25 NOTE — PATIENT INSTRUCTIONS
Due to the volume of surgeries at our practice, please allow the surgery scheduler up to 14 business days (the office is closed Saturdays and Sundays) to call you to schedule surgery. If it has not been 14 business days after your office visit where surgery was discussed, please wait the appropriate time frame prior to calling office.  SURGERY:_____/_____/_____    FASTING RECOMMENDATIONS:  Ingested material minimum fasting period:  Clear Liquids- 2 hours (examples of clear liquids include water, fruit juices without pulp, carbonated beverages, clear tea, and black coffee)  Breast Milk- 4 hours   Infant Formula- 6 Hours   Nonhuman milk- 6 hours (Since nonhuman milk is like solids in gastric emptying time, the amount ingested must be considered when determining an appropriate fasting period)  Light meal- 6 hours (a light meal typically consist of toast and clear liquids. Meals that include fried or fatty foods or meat may prolong gastric emptying time. Additional fasting time (e.g. 8 or more hours) may be needed in these cases. Both the amount and type of foods ingested must be considered when determining an appropriate fasting period. Exceptions are ERAS protocol surgeries.)    DO NOT TAKE ANY ASPIRIN PRODUCTS 7 days prior to surgery. Tylenol only. No Advil, Motrin, Aleve, or Ibuprofen. IF YOU ARE ON BLOOD THINNERS (PLAVIX, COUMADIN, ELIQUIS ETC) THESE WILL ALSO NEED TO BE HELD.    Special administration instructions related to diabetic medications include: GLP-1 agonist medications (ex. Ozempic and Trulicity) are to adhere to guidelines as follows:   Glucagon-like-peptide-1 (GLP-1) agonist medication:  Hold GLP-1 agonist beginning 1 day prior to the day of surgery for those who date the medication daily. (Ex. Sx scheduled Wednesday-- last dose of GLP-1 agonist on Monday)  Hold GLP-1 agonist one week prior  to the procedure/surgery for patients who take the medications weekly.  Sodium Glucose Co-Transporter 2 (SGLT2)

## 2024-11-25 NOTE — TELEPHONE ENCOUNTER
Patient called in to update Dr La on his health the last few months.  Patient stated he had a stroke in September 2024.  Patient is currently taking plavix.  Patient has ablation scheduled 12/16/24.  Patient wants to know if ok to proceed with ablation?

## 2024-11-26 NOTE — TELEPHONE ENCOUNTER
I left message for patient to call back. I have appt on hold for him to see Shalini Hyman NP on 12/20/24.

## 2024-11-27 ENCOUNTER — TELEPHONE (OUTPATIENT)
Dept: ENT CLINIC | Age: 64
End: 2024-11-27

## 2024-11-27 RX ORDER — FLUTICASONE PROPIONATE 50 MCG
2 SPRAY, SUSPENSION (ML) NASAL DAILY
Qty: 3 EACH | Refills: 1 | Status: SHIPPED | OUTPATIENT
Start: 2024-11-27

## 2024-12-02 ASSESSMENT — ENCOUNTER SYMPTOMS
COUGH: 0
SINUS PRESSURE: 1
SINUS PAIN: 1
SHORTNESS OF BREATH: 0
VOMITING: 0

## 2024-12-02 NOTE — PROGRESS NOTES
Average packs/day: 1.5 packs/day for 40.0 years (60.0 ttl pk-yrs)     Types: Cigarettes     Start date: 1982     Quit date: 2022     Years since quittin.5    Smokeless tobacco: Never   Vaping Use    Vaping status: Every Day    Substances: Nicotine, Flavoring   Substance Use Topics    Alcohol use: Yes     Comment: 1 beer/day    Drug use: Never     History reviewed. No pertinent family history.    Review of Systems   Constitutional:  Negative for chills and fever.   HENT:  Positive for congestion, sinus pressure and sinus pain. Negative for ear discharge and hearing loss.    Respiratory:  Negative for cough and shortness of breath.    Cardiovascular:  Negative for chest pain and palpitations.   Gastrointestinal:  Negative for vomiting.   Skin:  Negative for rash.   Allergic/Immunologic: Negative for environmental allergies.   Neurological:  Negative for dizziness and headaches.   Hematological:  Does not bruise/bleed easily.   All other systems reviewed and are negative.      /78 (Site: Left Upper Arm, Position: Sitting, Cuff Size: Medium Adult)   Pulse 60   Temp 96.9 °F (36.1 °C)   Resp 18   Ht 1.829 m (6')   Wt 91.6 kg (202 lb)   SpO2 96%   BMI 27.40 kg/m²   Physical Exam  Vitals and nursing note reviewed.   Constitutional:       Appearance: He is well-developed.   HENT:      Head: Normocephalic and atraumatic.      Right Ear: Tympanic membrane and ear canal normal.      Left Ear: Tympanic membrane and ear canal normal.      Nose: No congestion or rhinorrhea.   Eyes:      Pupils: Pupils are equal, round, and reactive to light.   Neck:      Thyroid: No thyromegaly.      Trachea: No tracheal deviation.   Cardiovascular:      Rate and Rhythm: Normal rate.   Pulmonary:      Effort: Pulmonary effort is normal. No respiratory distress.   Musculoskeletal:         General: Normal range of motion.      Cervical back: Normal range of motion.   Lymphadenopathy:      Cervical: No cervical adenopathy.

## 2024-12-04 ENCOUNTER — TELEPHONE (OUTPATIENT)
Dept: ENT CLINIC | Age: 64
End: 2024-12-04

## 2024-12-04 ENCOUNTER — PREP FOR PROCEDURE (OUTPATIENT)
Dept: ENT CLINIC | Age: 64
End: 2024-12-04

## 2024-12-04 DIAGNOSIS — J32.0 CHRONIC MAXILLARY SINUSITIS: ICD-10-CM

## 2024-12-04 DIAGNOSIS — J34.2 DEVIATED NASAL SEPTUM: ICD-10-CM

## 2024-12-06 ENCOUNTER — TELEPHONE (OUTPATIENT)
Dept: ENT CLINIC | Age: 64
End: 2024-12-06

## 2024-12-06 NOTE — TELEPHONE ENCOUNTER
Left voicemail for patient requesting a call back to discuss Flonase. Office received a request 11/27/24 for a fill to be sent to [facility] and script was sent. Office received another fill today 12/6/24 for the same script to be sent to [facility]. Awaiting return call to discuss.

## 2024-12-09 RX ORDER — METOPROLOL SUCCINATE 100 MG/1
100 TABLET, EXTENDED RELEASE ORAL EVERY 12 HOURS
Qty: 180 TABLET | Refills: 3 | Status: SHIPPED | OUTPATIENT
Start: 2024-12-09

## 2024-12-09 RX ORDER — LANOLIN ALCOHOL/MO/W.PET/CERES
CREAM (GRAM) TOPICAL
Qty: 90 TABLET | Refills: 3 | Status: SHIPPED | OUTPATIENT
Start: 2024-12-09

## 2024-12-10 NOTE — TELEPHONE ENCOUNTER
Patient returned call and states he is needing to switch all of his prescriptions to [facility]. Script pended.

## 2024-12-11 RX ORDER — FLUTICASONE PROPIONATE 50 MCG
2 SPRAY, SUSPENSION (ML) NASAL DAILY
Qty: 3 EACH | Refills: 1 | Status: SHIPPED | OUTPATIENT
Start: 2024-12-11

## 2024-12-20 ENCOUNTER — OFFICE VISIT (OUTPATIENT)
Dept: NON INVASIVE DIAGNOSTICS | Age: 64
End: 2024-12-20
Payer: COMMERCIAL

## 2024-12-20 VITALS
BODY MASS INDEX: 27.58 KG/M2 | SYSTOLIC BLOOD PRESSURE: 120 MMHG | RESPIRATION RATE: 18 BRPM | HEART RATE: 85 BPM | HEIGHT: 72 IN | WEIGHT: 203.6 LBS | DIASTOLIC BLOOD PRESSURE: 70 MMHG

## 2024-12-20 DIAGNOSIS — Z98.890 S/P ABLATION OF VENTRICULAR ARRHYTHMIA: ICD-10-CM

## 2024-12-20 DIAGNOSIS — I63.9 CRYPTOGENIC STROKE (HCC): ICD-10-CM

## 2024-12-20 DIAGNOSIS — I49.3 PVC (PREMATURE VENTRICULAR CONTRACTION): ICD-10-CM

## 2024-12-20 DIAGNOSIS — I49.9 IRREGULAR HEART BEAT: Primary | ICD-10-CM

## 2024-12-20 DIAGNOSIS — I50.22 CHRONIC SYSTOLIC (CONGESTIVE) HEART FAILURE (HCC): ICD-10-CM

## 2024-12-20 DIAGNOSIS — Z86.79 S/P ABLATION OF VENTRICULAR ARRHYTHMIA: ICD-10-CM

## 2024-12-20 PROCEDURE — 93000 ELECTROCARDIOGRAM COMPLETE: CPT | Performed by: NURSE PRACTITIONER

## 2024-12-20 PROCEDURE — 1123F ACP DISCUSS/DSCN MKR DOCD: CPT | Performed by: NURSE PRACTITIONER

## 2024-12-20 PROCEDURE — 99214 OFFICE O/P EST MOD 30 MIN: CPT | Performed by: NURSE PRACTITIONER

## 2024-12-20 RX ORDER — CLOPIDOGREL BISULFATE 75 MG/1
75 TABLET ORAL DAILY
COMMUNITY
End: 2024-12-20

## 2024-12-20 RX ORDER — CLOPIDOGREL BISULFATE 75 MG/1
75 TABLET ORAL DAILY
COMMUNITY
Start: 2024-11-12 | End: 2025-11-12

## 2024-12-20 NOTE — PROGRESS NOTES
[facility] CARDIOLOGY  CARDIAC ELECTROPHYSIOLOGY DEPARTMENT/DIVISION OF CARDIOLOGY  Outpatient Progress Note  PATIENT: Chandu Montanez  MEDICAL RECORD NUMBER: [id number]  DATE OF SERVICE:  12/20/2024 ELECTROPHYSIOLOGIST:  Royce La D.O.  PCP: Rony Malik DO  CHIEF COMPLAINT: PVC    HPI: Chandu Montanez is a 64 y.o. male with a history of anterior RVOT PVC sp RFA (11/15/2022-Dr. La), NYHA class I HFpEF-borderline 2/2 PVC, alcohol abuse (resolved), and cannabis use (resolved).  In 9/2021, he was diagnosed with NICM (LVEF = 45-50%) 2/2 PVC based on TTE and LHC, which was treated with GDMT. In 12/2021, a 2 week event monitor reproted VE burden of 42% and frequent NSVT. He stopped EtOH/durg use. In 7/2022, he was referred to Dr. La, who noted PVC likely from anterior RVOT and recommended cessation of EtOH/drug use. A 72 hour 3 lead monitor reported VE burden of 43% of predominantly single morphology and a TTE reported persistent LVEF of 45-50%.  In 11/2022, PVC ablation performed at site of earliest activation (30 ms pre-QRS) on anterior RVOT.  In 12/2022, he was noted to have recurrence of PVCs on ECG at time of office visit.  Cardiac monitor reported VE burden of 19% with 3 morphologies of relatively equal distribution.  He was reportedly noncompliant with metoprolol around this time, so metoprolol compliance encouraged.  In 3/2023, event monitor reported VE burden of 4.1% (predominantly single morphology).  ECG at that time with PVC likely from anterior RVOT.  In 8/2024, patient complained of episodes of bradycardia on personal cardiac monitor (watch) and symptoms of shortness of breath/dizziness.  An event monitor reported patient symptoms during sinus with VE and VE burden of 19.4% (predominantly single morphology).  TTE ordered, but not yet obtained due to insurance issues.  He has switched insurances.  He was scheduled for a PVC ablation on 12/16/2024.  This was canceled due to

## 2025-01-03 RX ORDER — METOPROLOL SUCCINATE 100 MG/1
100 TABLET, EXTENDED RELEASE ORAL EVERY 12 HOURS
Qty: 180 TABLET | Refills: 3 | Status: SHIPPED | OUTPATIENT
Start: 2025-01-03

## 2025-01-06 ENCOUNTER — TELEPHONE (OUTPATIENT)
Dept: NON INVASIVE DIAGNOSTICS | Age: 65
End: 2025-01-06

## 2025-01-06 NOTE — TELEPHONE ENCOUNTER
Patient called in and wanted to speak with you.  He had questions on ILR implant and also regarding an upcoming surgery.  I did let him know he will need to contact his pcp if he needs any clearances as he is not on OAC.

## 2025-01-08 ENCOUNTER — TELEPHONE (OUTPATIENT)
Dept: NON INVASIVE DIAGNOSTICS | Age: 65
End: 2025-01-08

## 2025-01-08 NOTE — TELEPHONE ENCOUNTER
----- Message from Shalini HAHN sent at 1/7/2025  8:05 AM EST -----  Can you please have him do a another 14 day monitor and schedule his echo. If the next 14 day monitor is not showing AF, he will be scheduled for a loop recorder.   KAPIL Laguerre CNP  ----- Message -----  From: Royce La DO  Sent: 1/7/2025   7:50 AM EST  To: KAPIL Laguerre CNP    Recommend:  1. 14 day event monitor.  2. TTE    If no AF/AFL, then can pursue ILR.    -Royce La DO  ----- Message -----  From: Shalini Hyman APRN - CNP  Sent: 1/6/2025   4:07 PM EST  To: Royce La DO    I discussed loop recorder with the patient and he is questioning whether he can be scheduled for this.  He wants to call to schedule the echo which I will call and remind him to do however I did not know if this would make a difference on his loop recorder plan.  He was calling for an update.  ThanksShalini  ----- Message -----  From: Royce La DO  Sent: 12/28/2024   5:30 PM EST  To: KAPIL Laguerre CNP    Did they do an echo?  -Royce La DO  ----- Message -----  From: Shalini Hyman APRN - CNP  Sent: 12/20/2024  12:18 PM EST  To: Royce La DO    Patient was scheduled for PVC ablation on 12/16/2024.  He states he had a stroke at the end of September, positive for parietal lobe infarcts on MRI in October 2024.  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  He seen neurology which ordered the MRI and when discussed with the patient on why they said he had a stroke, patient states they did not know why and possibly due to heart rhythm.  I discussed a loop recorder with him since he does have high PVC burden as well as possible A-fib with cryptogenic stroke.  He is agreement for loop recorder.  I did not know if he wanted to reschedule a PVC ablation in the setting of recent stroke.  He is now on Plavix.

## 2025-01-15 ENCOUNTER — TELEPHONE (OUTPATIENT)
Dept: ENT CLINIC | Age: 65
End: 2025-01-15

## 2025-01-17 NOTE — TELEPHONE ENCOUNTER
Patient returned our call and was given recommendations per Dr. La.  Patient scheduled for monitor placement on 1/20/2025 and will contact our echo department for scheduling.

## 2025-01-20 ENCOUNTER — LAB (OUTPATIENT)
Dept: NON INVASIVE DIAGNOSTICS | Age: 65
End: 2025-01-20

## 2025-01-20 DIAGNOSIS — I48.0 PAROXYSMAL ATRIAL FIBRILLATION (HCC): Primary | ICD-10-CM

## 2025-01-24 ENCOUNTER — TELEPHONE (OUTPATIENT)
Dept: ENT CLINIC | Age: 65
End: 2025-01-24

## 2025-01-24 NOTE — TELEPHONE ENCOUNTER
Patient notified surgery is cancelled for 1/28/25. A referral will be placed to Dr. Palomo's office and I will contact him at a later date to reschedule surgery. Patient understood.

## 2025-01-24 NOTE — TELEPHONE ENCOUNTER
Called patients verbalized cardiologist and they advised they do not provide cardiac clearance or Plavix instructions. Fax was seen scanned in by Cardio office that was not received by ENT. I contacted the patient to advise that his PCP has been contacted to discuss if they could possibly provide the clearance or if patient will need referred to Cardio, patient last saw Dr. Palomo 3+ years ago. Awaiting all back from PCP Dr. Malik office.

## 2025-01-24 NOTE — TELEPHONE ENCOUNTER
Dr. Malik's office returned call and advised that they could not provide a full cardiac clearance for patient. Patient will need referred to Dr. Palomo's office to obtain clearance for a new future surgery date that will be discussed with patient.

## 2025-01-24 NOTE — TELEPHONE ENCOUNTER
Josefa from [facility] requesting your return call regarding patients upcoming surgery. She advised that patient will need cardiac clearance and an ECHO prior to surgery. Please return call

## 2025-01-29 ENCOUNTER — PREP FOR PROCEDURE (OUTPATIENT)
Dept: ENT CLINIC | Age: 65
End: 2025-01-29

## 2025-01-29 DIAGNOSIS — Z01.818 PRE-OPERATIVE CLEARANCE: Primary | ICD-10-CM

## 2025-01-29 DIAGNOSIS — J34.2 DEVIATED SEPTUM: ICD-10-CM

## 2025-01-30 ENCOUNTER — TELEPHONE (OUTPATIENT)
Dept: ENT CLINIC | Age: 65
End: 2025-01-30

## 2025-01-30 ENCOUNTER — PREP FOR PROCEDURE (OUTPATIENT)
Dept: ENT CLINIC | Age: 65
End: 2025-01-30

## 2025-01-30 NOTE — TELEPHONE ENCOUNTER
Prior Authorization Form:      DEMOGRAPHICS:                     Patient Name:  Valentina Montanez  Patient :  1960            Insurance:  Payor: JAKE / Plan: [employer]LLC[employer] BY JAKE [facility] ADV / Product Type: *No Product type* /   Insurance ID Number:    Payer/Plan Subscr  Sex Relation Sub. Ins. ID Effective Group Num   1[facility] 1960 Male Self [id number]                                   [address]   2. MEDICARE - MEV[place]INA SERRANO 1960 Male Self [id number]                                    [address]          DIAGNOSIS & PROCEDURE:                       Procedure/Operation: BILATERAL FUNCTIONAL ENDOSCOPIC SINUS SURGERY, SEPTOPLASTY, SUBMUCOSAL RESECTION INFERIOR TURBINATES           CPT Code: 03501, 72282, 87133, 48677, 33916, 01994, 20279, 67095, 93520, 04377, 28044, 96896    Diagnosis:  CHRONIC PANSINUSITIS, SEPTAL DEVIATION    ICD10 Code: J32.0, J34.89    Location:  [facility]    Surgeon:  ASTON    SCHEDULING INFORMATION:                          Date: 3/11/25    Time: N/A              Anesthesia:  General                                                       Status:  Outpatient        Special Comments:  NAVIGATION       Electronically signed by Carlita Garcia MA on 2025 at 7:28 AM

## 2025-02-06 ENCOUNTER — TELEPHONE (OUTPATIENT)
Dept: ENT CLINIC | Age: 65
End: 2025-02-06

## 2025-02-06 NOTE — TELEPHONE ENCOUNTER
Patient called to advise that he has not been contacted by Dr. Palomo's office to re-establish with the provider and schedule for his cardiac clearance. I advised the clearance request with updated referral was axed on 1/30/25, that I would re-fax the request in case of technical error and also supplied him with the office phone number. I requested the patient contact me back to let me know if he was able to contact the office or not. Patient understood.

## 2025-02-07 ENCOUNTER — TELEPHONE (OUTPATIENT)
Dept: CARDIOLOGY CLINIC | Age: 65
End: 2025-02-07

## 2025-02-07 ENCOUNTER — OFFICE VISIT (OUTPATIENT)
Dept: CARDIOLOGY CLINIC | Age: 65
End: 2025-02-07

## 2025-02-07 VITALS
HEIGHT: 72 IN | WEIGHT: 190.5 LBS | BODY MASS INDEX: 25.8 KG/M2 | OXYGEN SATURATION: 100 % | DIASTOLIC BLOOD PRESSURE: 60 MMHG | HEART RATE: 103 BPM | RESPIRATION RATE: 18 BRPM | SYSTOLIC BLOOD PRESSURE: 108 MMHG | TEMPERATURE: 97.1 F

## 2025-02-07 DIAGNOSIS — J34.2 DEVIATED NASAL SEPTUM: ICD-10-CM

## 2025-02-07 DIAGNOSIS — I50.22 CHRONIC SYSTOLIC (CONGESTIVE) HEART FAILURE (HCC): ICD-10-CM

## 2025-02-07 DIAGNOSIS — J43.8 OTHER EMPHYSEMA (HCC): ICD-10-CM

## 2025-02-07 DIAGNOSIS — J32.0 CHRONIC MAXILLARY SINUSITIS: ICD-10-CM

## 2025-02-07 DIAGNOSIS — I49.3 FREQUENT PVCS: ICD-10-CM

## 2025-02-07 DIAGNOSIS — F17.200 TOBACCO DEPENDENCE SYNDROME: ICD-10-CM

## 2025-02-07 DIAGNOSIS — R94.31 ELECTROCARDIOGRAM ABNORMAL: ICD-10-CM

## 2025-02-07 DIAGNOSIS — F34.1 DYSTHYMIA: ICD-10-CM

## 2025-02-07 DIAGNOSIS — I47.29 NSVT (NONSUSTAINED VENTRICULAR TACHYCARDIA) (HCC): ICD-10-CM

## 2025-02-07 DIAGNOSIS — Z01.810 PREOPERATIVE CARDIOVASCULAR EXAMINATION: Primary | ICD-10-CM

## 2025-02-07 DIAGNOSIS — R53.82 CHRONIC FATIGUE: ICD-10-CM

## 2025-02-07 DIAGNOSIS — I20.0 UNSTABLE ANGINA (HCC): ICD-10-CM

## 2025-02-07 DIAGNOSIS — Z86.79 S/P ABLATION OF VENTRICULAR ARRHYTHMIA: ICD-10-CM

## 2025-02-07 DIAGNOSIS — E78.2 MIXED HYPERLIPIDEMIA: ICD-10-CM

## 2025-02-07 DIAGNOSIS — I49.9 IRREGULAR HEART BEAT: ICD-10-CM

## 2025-02-07 DIAGNOSIS — I49.3 PVC (PREMATURE VENTRICULAR CONTRACTION): ICD-10-CM

## 2025-02-07 DIAGNOSIS — R06.02 SHORTNESS OF BREATH: ICD-10-CM

## 2025-02-07 DIAGNOSIS — I20.89 OTHER FORMS OF ANGINA PECTORIS (HCC): ICD-10-CM

## 2025-02-07 DIAGNOSIS — J34.2 DEVIATED SEPTUM: ICD-10-CM

## 2025-02-07 DIAGNOSIS — I47.20 VENTRICULAR TACHYCARDIA (HCC): ICD-10-CM

## 2025-02-07 DIAGNOSIS — Z98.890 S/P ABLATION OF VENTRICULAR ARRHYTHMIA: ICD-10-CM

## 2025-02-07 DIAGNOSIS — I63.00 CEREBROVASCULAR ACCIDENT (CVA) DUE TO THROMBOSIS OF PRECEREBRAL ARTERY (HCC): ICD-10-CM

## 2025-02-07 RX ORDER — LISINOPRIL 2.5 MG/1
2.5 TABLET ORAL DAILY
COMMUNITY
Start: 2025-01-24

## 2025-02-07 NOTE — PROGRESS NOTES
rate of  104 bpm. Isolated SVEs were rare (<1.0%), SVE Couplets were rare (<1.0%), and no  SVE Triplets were present. Isolated VEs were frequent (9.2%, 16352), VE Couplets  were frequent (10.2%, 42710), and VE Triplets were rare (<1.0%, 1546). Ventricular  Bigeminy and Trigeminy were present.    The ASCVD Risk score (Dwight DK, et al., 2019) failed to calculate for the following reasons:    The patient has a prior MI or stroke diagnosis    ASSESSMENT / PLAN:    1. PVC (premature ventricular contraction)/ventricular tachycardia      2. Chronic systolic (congestive) heart failure (HCC)      3. SOB (shortness of breath)/dyspnea on exertion      4. Palpitations  -  5. Frequent PVCs      6. Tobacco abuse  Smoking cessation was highly recommended  7. Alcohol abuse  He was advised on the crucial need of avoiding alcohol abuse.  8.  Preoperative cardiovascular evaluation    9.  Prior CVA    Plan:  Since patient report dyspnea on exertion and shortness of breath and fatigue with activities will recommend cardiac PET myocardial perfusion stress test for further evaluation  Patient has Zio patch heart monitor for which he has not yet completed  He is advised to avoid alcohol and tobacco  He is following up with EP closely for evaluation and management of VT and PVC  Patient also advised to arrange for pulmonary function test with PCP  Continue on beta-blocker and lisinopril  If repeat echo shows EF remains low then we may switch from lisinopril to Eliquis and add spironolactone if kidney function can tolerate  Continue on statin therapy and follow-up with your PCP for routine blood work and lipid profile  TSH in September 2024 was fine  On Plavix for reported prior CVA  FOLLOW-UP in 3 months      Thank you for allowing me to participate in your patient's care. Please feel free to contact me if you have any questions or concerns.    Roderick Palomo MD  [facility] Cardiology

## 2025-02-10 NOTE — TELEPHONE ENCOUNTER
Prior authorization is PENDING. Clinical documentation was uploaded to [employer].      Tracking number: [id number]         .  Will keep you updated

## 2025-02-15 PROBLEM — I47.29 NSVT (NONSUSTAINED VENTRICULAR TACHYCARDIA) (HCC): Status: ACTIVE | Noted: 2025-02-15

## 2025-02-18 NOTE — TELEPHONE ENCOUNTER
Prior authorization is APPROVED. Documentation will be scanned in the patient chart.        Authorization number: [id number]        Valid dates: 02/10/2025-03/12/2025      [facility]

## 2025-02-20 DIAGNOSIS — I48.0 PAROXYSMAL ATRIAL FIBRILLATION (HCC): ICD-10-CM

## 2025-02-25 ENCOUNTER — TELEPHONE (OUTPATIENT)
Dept: NON INVASIVE DIAGNOSTICS | Age: 65
End: 2025-02-25

## 2025-02-25 ENCOUNTER — TELEPHONE (OUTPATIENT)
Dept: ENT CLINIC | Age: 65
End: 2025-02-25

## 2025-02-25 NOTE — TELEPHONE ENCOUNTER
Patient is agreeable to ILR Implant.  Patient is scheduled 3/18/25 at 11:30 am (arrival of 10:30 am).  Patient given instructions and verbalized understanding, copy sent through [employer].

## 2025-02-25 NOTE — TELEPHONE ENCOUNTER
Wife . Giana, calling to reschedule patients surgery and post op appt.  Patient will not be able to obtain clearance before his surgery on 3/11 so he will need to reschedule. The last of patients clearance items is his heart monitor and that happens on  March 18th. So something after that date.  Patient is still waiting to get his echo and stress test done but all will be done by March 18th.  Wife has a question regarding the auth for insurance.  She would like to know if it will still be good..

## 2025-02-25 NOTE — TELEPHONE ENCOUNTER
----- Message from KAPIL Kessler CNP sent at 2/20/2025 12:47 PM EST -----  Please let the patient know that the monitor showed no AF and schedule for ILR implant as discussed with Shalini in office. Thank you.  ----- Message -----  From: Dottie Pike MA  Sent: 2/20/2025   8:17 AM EST  To: KAPIL Hollis CNP

## 2025-03-05 ENCOUNTER — HOSPITAL ENCOUNTER (OUTPATIENT)
Dept: CARDIOLOGY | Age: 65
Discharge: HOME | End: 2025-03-07
Attending: INTERNAL MEDICINE
Payer: COMMERCIAL

## 2025-03-05 VITALS
BODY MASS INDEX: 25.73 KG/M2 | HEIGHT: 72 IN | DIASTOLIC BLOOD PRESSURE: 60 MMHG | SYSTOLIC BLOOD PRESSURE: 108 MMHG | WEIGHT: 190 LBS

## 2025-03-05 DIAGNOSIS — R06.02 SHORTNESS OF BREATH: ICD-10-CM

## 2025-03-05 LAB
ECHO AO ASC DIAM: 3.4 CM
ECHO AV AREA PEAK VELOCITY: 2 CM2
ECHO AV AREA VTI: 2.8 CM2
ECHO AV CUSP MM: 2.8 CM
ECHO AV MEAN GRADIENT: 2 MMHG
ECHO AV MEAN VELOCITY: 0.7 M/S
ECHO AV PEAK GRADIENT: 5 MMHG
ECHO AV PEAK VELOCITY: 1.1 M/S
ECHO AV VTI: 18.1 CM
ECHO BSA: 2.09 M2
ECHO EST RA PRESSURE: 3 MMHG
ECHO LA DIAMETER: 3.6 CM
ECHO LA VOL A-L A2C: 66 ML (ref 18–58)
ECHO LA VOL A-L A4C: 43 ML (ref 18–58)
ECHO LA VOL MOD A2C: 60 ML (ref 18–58)
ECHO LA VOL MOD A4C: 39 ML (ref 18–58)
ECHO LA VOLUME AREA LENGTH: 54 ML
ECHO LV INTERNAL DIMENSION DIASTOLIC: 5.1 CM (ref 4.2–5.9)
ECHO LV INTERNAL DIMENSION SYSTOLIC: 3.7 CM
ECHO LV ISOVOLUMETRIC RELAXATION TIME (IVRT): 101.5 MS
ECHO LV IVSD: 1 CM (ref 0.6–1)
ECHO LV IVSS: 1.4 CM
ECHO LV MASS 2D: 175 G (ref 88–224)
ECHO LV POSTERIOR WALL DIASTOLIC: 1 CM (ref 0.6–1)
ECHO LV POSTERIOR WALL SYSTOLIC: 1.4 CM
ECHO LVOT DIAM: 1.9 CM
ECHO LVOT MEAN GRADIENT: 2 MMHG
ECHO LVOT PEAK GRADIENT: 2 MMHG
ECHO LVOT PEAK VELOCITY: 0.8 M/S
ECHO LVOT SV: 51.3 ML
ECHO LVOT VTI: 18 CM
ECHO MV "A" WAVE DURATION: 80.7 MSEC
ECHO MV A VELOCITY: 0.8 M/S
ECHO MV AREA PHT: 2.3 CM2
ECHO MV AREA VTI: 1.9 CM2
ECHO MV E DECELERATION TIME (DT): 258.4 MS
ECHO MV E VELOCITY: 0.86 M/S
ECHO MV MAX VELOCITY: 1 M/S
ECHO MV MEAN GRADIENT: 1 MMHG
ECHO MV MEAN VELOCITY: 0.5 M/S
ECHO MV PEAK GRADIENT: 4 MMHG
ECHO MV PRESSURE HALF TIME (PHT): 95 MS
ECHO MV VTI: 27.1 CM
ECHO PV MAX VELOCITY: 0.8 M/S
ECHO PV MEAN GRADIENT: 1 MMHG
ECHO PV MEAN VELOCITY: 0.5 M/S
ECHO PV PEAK GRADIENT: 3 MMHG
ECHO PV VTI: 17.5 CM
ECHO PVEIN A DURATION: 114.2 MS
ECHO PVEIN A VELOCITY: 0.2 M/S
ECHO PVEIN PEAK D VELOCITY: 0.5 M/S
ECHO PVEIN PEAK S VELOCITY: 0.6 M/S
ECHO RIGHT VENTRICULAR SYSTOLIC PRESSURE (RVSP): 20 MMHG
ECHO RV INTERNAL DIMENSION: 2.4 CM
ECHO TV REGURGITANT MAX VELOCITY: 2.08 M/S
ECHO TV REGURGITANT PEAK GRADIENT: 17 MMHG

## 2025-03-05 PROCEDURE — 93306 TTE W/DOPPLER COMPLETE: CPT

## 2025-03-05 NOTE — TELEPHONE ENCOUNTER
Auth request & all clinicals uploaded to [facility] by [facility].  Auth #:  [id number]

## 2025-03-06 ENCOUNTER — TELEPHONE (OUTPATIENT)
Dept: ENT CLINIC | Age: 65
End: 2025-03-06

## 2025-03-06 NOTE — TELEPHONE ENCOUNTER
Bela contacted and advised surgery will be cancelled due to being unable to obtain cardiac clearance in time. Transferred to scheduling and case cancelled.

## 2025-03-06 NOTE — TELEPHONE ENCOUNTER
Bela from [facility] left voicemail advising patient is scheduled for surgery 3/11/25 but it will need rescheduled because he is not able to get cardiac clearance until after 3/18/25. She is requesting you return her call.

## 2025-03-07 ENCOUNTER — PREP FOR PROCEDURE (OUTPATIENT)
Dept: ENT CLINIC | Age: 65
End: 2025-03-07

## 2025-03-07 DIAGNOSIS — J34.2 NASAL SEPTAL DEVIATION: ICD-10-CM

## 2025-03-09 PROBLEM — Z01.810 PREOPERATIVE CARDIOVASCULAR EXAMINATION: Status: RESOLVED | Noted: 2025-02-07 | Resolved: 2025-03-09

## 2025-03-12 ENCOUNTER — TELEPHONE (OUTPATIENT)
Dept: NON INVASIVE DIAGNOSTICS | Age: 65
End: 2025-03-12

## 2025-03-12 NOTE — TELEPHONE ENCOUNTER
Per patient's insurance they have until 3/19/25 to come to dicision on whether to approve or deny ILR implant.  I left message for patient with info and procedure may have to be r/s if we do not receive info in time.

## 2025-03-17 ENCOUNTER — TELEPHONE (OUTPATIENT)
Dept: CARDIOLOGY CLINIC | Age: 65
End: 2025-03-17

## 2025-03-17 ENCOUNTER — TELEPHONE (OUTPATIENT)
Dept: NON INVASIVE DIAGNOSTICS | Age: 65
End: 2025-03-17

## 2025-03-17 NOTE — TELEPHONE ENCOUNTER
Spoke with patient to let him know procedure had to be moved due to not receiving authorization for procedure.  Patient is scheduled 4/8/25 at 12:30 am.

## 2025-03-17 NOTE — TELEPHONE ENCOUNTER
Cardiac Clearance needed for:    Functional Endoscopic Sinus Surgery, Septoplasty, Submucosal Resection Inferior Nasal Turbinate's    Date: 4/7/25  Dr. Moss    Patient denies any chest pain, shortness of breath or palpitations since last visit in February.  Patient is able to complete all activities of daily living, including; climbing one flight of stairs and walking one block without cardiac symptoms. Please advise.      PET results has been scanned into [employer]

## 2025-03-18 ENCOUNTER — TELEPHONE (OUTPATIENT)
Dept: NON INVASIVE DIAGNOSTICS | Age: 65
End: 2025-03-18

## 2025-03-18 NOTE — TELEPHONE ENCOUNTER
Returned call to Estee about the scheduled procedure on 4/8/2025. This date does not work. I left her a message stating alena will reach out to her this week to reschedule. Message sent to Alena.     Electronically signed by Diana Velazquez MA on 3/18/2025 at 10:04 AM

## 2025-03-19 NOTE — TELEPHONE ENCOUNTER
Rescheduled to 5/7/25 at 12:30 pm (arrival of 11:30 am).  Patient given instructions again and verbalized understanding.

## 2025-03-24 LAB
ECHO AO ASC DIAM: 3.4 CM
ECHO AO ASCENDING AORTA INDEX: 1.63 CM/M2
ECHO AV AREA PEAK VELOCITY: 2 CM2
ECHO AV AREA VTI: 2.8 CM2
ECHO AV AREA/BSA PEAK VELOCITY: 1 CM2/M2
ECHO AV AREA/BSA VTI: 1.3 CM2/M2
ECHO AV CUSP MM: 2.8 CM
ECHO AV MEAN GRADIENT: 2 MMHG
ECHO AV MEAN VELOCITY: 0.7 M/S
ECHO AV PEAK GRADIENT: 5 MMHG
ECHO AV PEAK VELOCITY: 1.1 M/S
ECHO AV VELOCITY RATIO: 0.73
ECHO AV VTI: 18.1 CM
ECHO BSA: 2.09 M2
ECHO EST RA PRESSURE: 3 MMHG
ECHO LA DIAMETER INDEX: 1.73 CM/M2
ECHO LA DIAMETER: 3.6 CM
ECHO LA VOL A-L A2C: 66 ML (ref 18–58)
ECHO LA VOL A-L A4C: 43 ML (ref 18–58)
ECHO LA VOL MOD A2C: 60 ML (ref 18–58)
ECHO LA VOL MOD A4C: 39 ML (ref 18–58)
ECHO LA VOLUME AREA LENGTH: 54 ML
ECHO LA VOLUME INDEX A-L A2C: 32 ML/M2 (ref 16–34)
ECHO LA VOLUME INDEX A-L A4C: 21 ML/M2 (ref 16–34)
ECHO LA VOLUME INDEX AREA LENGTH: 26 ML/M2 (ref 16–34)
ECHO LA VOLUME INDEX MOD A2C: 29 ML/M2 (ref 16–34)
ECHO LA VOLUME INDEX MOD A4C: 19 ML/M2 (ref 16–34)
ECHO LV EJECTION FRACTION 3D: 55 %
ECHO LV FRACTIONAL SHORTENING: 27 % (ref 28–44)
ECHO LV INTERNAL DIMENSION DIASTOLE INDEX: 2.45 CM/M2
ECHO LV INTERNAL DIMENSION DIASTOLIC: 5.1 CM (ref 4.2–5.9)
ECHO LV INTERNAL DIMENSION SYSTOLIC INDEX: 1.78 CM/M2
ECHO LV INTERNAL DIMENSION SYSTOLIC: 3.7 CM
ECHO LV ISOVOLUMETRIC RELAXATION TIME (IVRT): 101.5 MS
ECHO LV IVSD: 1 CM (ref 0.6–1)
ECHO LV IVSS: 1.4 CM
ECHO LV MASS 2D: 188 G (ref 88–224)
ECHO LV MASS INDEX 2D: 90.4 G/M2 (ref 49–115)
ECHO LV POSTERIOR WALL DIASTOLIC: 1 CM (ref 0.6–1)
ECHO LV POSTERIOR WALL SYSTOLIC: 1.4 CM
ECHO LV RELATIVE WALL THICKNESS RATIO: 0.39
ECHO LVOT AREA: 2.8 CM2
ECHO LVOT AV VTI INDEX: 0.99
ECHO LVOT DIAM: 1.9 CM
ECHO LVOT MEAN GRADIENT: 2 MMHG
ECHO LVOT PEAK GRADIENT: 2 MMHG
ECHO LVOT PEAK VELOCITY: 0.8 M/S
ECHO LVOT STROKE VOLUME INDEX: 24.5 ML/M2
ECHO LVOT SV: 51 ML
ECHO LVOT VTI: 18 CM
ECHO MV "A" WAVE DURATION: 80.7 MSEC
ECHO MV A VELOCITY: 0.8 M/S
ECHO MV AREA PHT: 2.3 CM2
ECHO MV AREA VTI: 1.9 CM2
ECHO MV E DECELERATION TIME (DT): 258.4 MS
ECHO MV E VELOCITY: 0.86 M/S
ECHO MV E/A RATIO: 1.08
ECHO MV LVOT VTI INDEX: 1.51
ECHO MV MAX VELOCITY: 1 M/S
ECHO MV MEAN GRADIENT: 1 MMHG
ECHO MV MEAN VELOCITY: 0.5 M/S
ECHO MV PEAK GRADIENT: 4 MMHG
ECHO MV PRESSURE HALF TIME (PHT): 95 MS
ECHO MV VTI: 27.1 CM
ECHO PV MAX VELOCITY: 0.8 M/S
ECHO PV MEAN GRADIENT: 1 MMHG
ECHO PV MEAN VELOCITY: 0.5 M/S
ECHO PV PEAK GRADIENT: 3 MMHG
ECHO PV VTI: 17.5 CM
ECHO PVEIN A DURATION: 114.2 MS
ECHO PVEIN A VELOCITY: 0.2 M/S
ECHO PVEIN PEAK D VELOCITY: 0.5 M/S
ECHO PVEIN PEAK S VELOCITY: 0.6 M/S
ECHO PVEIN S/D RATIO: 1.2
ECHO RIGHT VENTRICULAR SYSTOLIC PRESSURE (RVSP): 20 MMHG
ECHO RV INTERNAL DIMENSION: 2.4 CM
ECHO TV REGURGITANT MAX VELOCITY: 2.08 M/S
ECHO TV REGURGITANT PEAK GRADIENT: 17 MMHG

## 2025-03-27 ENCOUNTER — RESULTS FOLLOW-UP (OUTPATIENT)
Dept: CARDIOLOGY | Age: 65
End: 2025-03-27

## 2025-03-27 NOTE — TELEPHONE ENCOUNTER
----- Message from Dr. Roderick Palomo MD sent at 3/27/2025 12:10 PM EDT -----  Heart function is lower end of normal.  Details to be discussed during follow-up visit.

## 2025-04-03 ENCOUNTER — OFFICE VISIT (OUTPATIENT)
Dept: CARDIOLOGY CLINIC | Age: 65
End: 2025-04-03

## 2025-04-03 VITALS
WEIGHT: 190 LBS | BODY MASS INDEX: 25.73 KG/M2 | RESPIRATION RATE: 18 BRPM | HEIGHT: 72 IN | HEART RATE: 69 BPM | DIASTOLIC BLOOD PRESSURE: 68 MMHG | TEMPERATURE: 97.7 F | SYSTOLIC BLOOD PRESSURE: 122 MMHG | OXYGEN SATURATION: 91 %

## 2025-04-03 DIAGNOSIS — I47.9 PAROXYSMAL TACHYCARDIA: ICD-10-CM

## 2025-04-03 DIAGNOSIS — E78.2 MIXED HYPERLIPIDEMIA: ICD-10-CM

## 2025-04-03 DIAGNOSIS — I50.22 CHRONIC SYSTOLIC (CONGESTIVE) HEART FAILURE: ICD-10-CM

## 2025-04-03 DIAGNOSIS — I20.0 UNSTABLE ANGINA (HCC): ICD-10-CM

## 2025-04-03 DIAGNOSIS — I49.3 PVC (PREMATURE VENTRICULAR CONTRACTION): ICD-10-CM

## 2025-04-03 DIAGNOSIS — Z86.79 S/P ABLATION OF VENTRICULAR ARRHYTHMIA: ICD-10-CM

## 2025-04-03 DIAGNOSIS — Z98.890 S/P ABLATION OF VENTRICULAR ARRHYTHMIA: ICD-10-CM

## 2025-04-03 DIAGNOSIS — M51.360 DEGENERATION OF INTERVERTEBRAL DISC OF LUMBAR REGION WITH DISCOGENIC BACK PAIN: ICD-10-CM

## 2025-04-03 DIAGNOSIS — I47.29 NSVT (NONSUSTAINED VENTRICULAR TACHYCARDIA) (HCC): ICD-10-CM

## 2025-04-03 DIAGNOSIS — I63.00 CEREBROVASCULAR ACCIDENT (CVA) DUE TO THROMBOSIS OF PRECEREBRAL ARTERY (HCC): ICD-10-CM

## 2025-04-03 DIAGNOSIS — J42 CHRONIC BRONCHITIS, UNSPECIFIED CHRONIC BRONCHITIS TYPE (HCC): ICD-10-CM

## 2025-04-03 DIAGNOSIS — Z01.810 PREOPERATIVE CARDIOVASCULAR EXAMINATION: Primary | ICD-10-CM

## 2025-04-03 DIAGNOSIS — I49.9 IRREGULAR HEART BEAT: ICD-10-CM

## 2025-04-03 DIAGNOSIS — I42.1 HYPERTROPHIC OBSTRUCTIVE CARDIOMYOPATHY (HCC): ICD-10-CM

## 2025-04-03 DIAGNOSIS — I49.3 FREQUENT PVCS: ICD-10-CM

## 2025-04-03 DIAGNOSIS — J34.2 NASAL SEPTAL DEVIATION: ICD-10-CM

## 2025-04-03 DIAGNOSIS — F17.200 TOBACCO DEPENDENCE SYNDROME: ICD-10-CM

## 2025-04-03 RX ORDER — ATOGEPANT 60 MG/1
1 TABLET ORAL DAILY
COMMUNITY
Start: 2025-03-26

## 2025-04-03 NOTE — PROGRESS NOTES
Out Patient CARDIOLOGY FOLLOW UP    Name: Chandu Montaenz    Age: 64 y.o.    Date of Service: 4/3/2025      Referring Physician: No admitting provider for patient encounter.    Chief Complaint   Patient presents with    Cardiac Clearance     ENT surgery on Monday, 4/7/2025, Dr. Moss, outpatient procedure        History of Present Illness: 64-year-old male with history of tobacco abuse, alcohol abuse, anxiety, depression, and sedentary lifestyle and heart failure with reduced EF with last echo in 2021 revealing EF of 45 to 50%.  He also has history of PVC managed by EP.  He present for follow-up visit today and  reports he has been active without any symptoms.  He reports since  EP increase his metoprolol to 200 mg a day for treatment of PVCs, his symptoms have significantly improved.  He reports he is able to go up 20-30 steps most days at work without inducible symptoms and subsequently able to achieve more than 4 METS and so may proceed to surgery without additional cardiac testing.  His recent cardiac PET myocardial perfusion stress test revealed no ischemia but the EF was noted to be around 44% likely in the setting of PVCs          Review of Systems:   Cardiac: As per HPI  General: Denies fever or chills  Pulmonary: As per HPI  HEENT: Denies runny nose  GI: No complaints  : No complaints  Endocrine: Denies night sweats  Musculoskeletal: No complaints  Skin: Dry skin  Neuro: No complaints  Psych: Denies depression    Past Medical History:  Past Medical History:   Diagnosis Date    Chronic obstructive lung disease (HCC) 9/24/2021    Chronic systolic (congestive) heart failure 9/10/2021    Degeneration of lumbar intervertebral disc 3/19/2020    Mixed hyperlipidemia 11/29/2021       Past Surgical History:  Past Surgical History:   Procedure Laterality Date    ABLATION OF DYSRHYTHMIC FOCUS  11/15/2022    PVC  (Dr. La)       Family History:  No family history on file.    Social History:  Social History

## 2025-04-04 NOTE — PROGRESS NOTES
[facility] PRE-ADMISSION TESTING INSTRUCTIONS    The Preadmission Testing patient is instructed accordingly using the following criteria (check applicable):    ARRIVAL INSTRUCTIONS:   Arrival Time: 0715    [x] Parking the day of Surgery is located in the Main Entrance lot.  Upon entering through the main entrance (Entrance A) make an immediate right to the surgery reception desk    [x] Bring photo ID and insurance card    [x] Bring in a copy of Living will or Durable Power of  papers.    [x] Please be sure to arrange for a responsible adult to provide transportation to and from the hospital    [x] Please arrange for a responsible adult to be with you for the 24 hour period post procedure, due to having anesthesia    [x] Please notify surgeon if you develop any illness between now and time of surgery (cold, cough, sore throat, fever, nausea, vomiting) or any signs of infections  including skin, wounds, and dental.    [x] If you awake am of surgery not feeling well or have temperature >100 please call [phone].    GENERAL INSTRUCTIONS:    [x] No solid foods after midnight, may have up to 8oz of water until 4 hours prior to surgery. No gum, no candy, no mints.  NPO time: 0515       [x] You may brush your teeth    [x] Take medications as instructed (Read back and verified by patient)   Take celexa, metoprolol, topamax    [x] Stop herbal supplements and vitamins 5 days prior to procedure    [x] Follow preop dosing of blood thinners per physician instructions    [x] Shower or bath with soap, lather and rinse well, AM of Surgery, no lotion, powders or creams to surgical site    [x] Please do not wear any nail polish, make up, hair products, body spray, aftershave, cologne or perfume on the day of surgery    [x] Jewelry, body piercings, eyeglasses, contact lenses and dentures are not permitted into surgery (bring cases)    [x] No tobacco products within 24 hours of surgery     [x] No  alcohol or illegal drug use, marijuana included, within 24 hours of surgery.    [x] You can expect a call the business day prior to procedure to notify you if your arrival time changes    [x] If you receive a survey after surgery we would greatly appreciate your comments    [x]  The Outpatient Pharmacy is available to fill your prescription here on your day of surgery, ask your preop nurse for details    All patient questions answered at this time.

## 2025-04-06 NOTE — H&P
[facility]  LOVELY MeadowsO. Ms.Ed           Patient Name:  Chandu Montanez  :  1960      CHIEF C/O:         Chief Complaint   Patient presents with    Follow-up       1 mo CT Sinus (In Epic)            HISTORY OBTAINED FROM:  patient     HISTORY OF PRESENT ILLNESS:       Chandu is a 64 y.o. year old male, here today for follow up of:       Review of CT scan pressure recurrent sinusitis.  CT scan of reviewed interpreted with the patient present consistent with right maxillary sinus involvement consistent with possible polypoid changes maxillary sinusitis.           Past Medical History        Past Medical History:   Diagnosis Date    Chronic obstructive lung disease (HCC) 2021    Chronic systolic (congestive) heart failure (HCC) 9/10/2021    Degeneration of lumbar intervertebral disc 3/19/2020    Mixed hyperlipidemia 2021         Past Surgical History         Past Surgical History:   Procedure Laterality Date    ABLATION OF DYSRHYTHMIC FOCUS   11/15/2022     PVC  (Dr. La)           Current Medication      Current Outpatient Medications:     rosuvastatin (CRESTOR) 20 MG tablet, Take 1 tablet by mouth daily, Disp: , Rfl:     rizatriptan (MAXALT) 10 MG tablet, Take 1 tablet by mouth once as needed for Migraine May repeat in 2 hours if needed, Disp: , Rfl:     Fremanezumab-vfrm (AJOVY) 225 MG/1.5ML SOAJ, Inject into the skin, Disp: , Rfl:     topiramate (TOPAMAX) 25 MG tablet, Take 1 tablet by mouth 2 times daily, Disp: , Rfl:     metoprolol succinate (TOPROL XL) 100 MG extended release tablet, Take 1 tablet by mouth in the morning and 1 tablet in the evening., Disp: 180 tablet, Rfl: 1    MAGnesium-Oxide 400 (240 Mg) MG tablet, TAKE 1 TABLET BY MOUTH DAILY, Disp: 90 tablet, Rfl: 1    citalopram (CELEXA) 40 MG tablet, Take 1 tablet by mouth daily, Disp: , Rfl:     fluticasone (FLONASE) 50 MCG/ACT nasal spray, 2 sprays by Each Nostril route daily, Disp: 3 each, Rfl: 1     Patient

## 2025-04-07 ENCOUNTER — HOSPITAL ENCOUNTER (OUTPATIENT)
Age: 65
Setting detail: OUTPATIENT SURGERY
Discharge: HOME | End: 2025-04-07
Attending: OTOLARYNGOLOGY | Admitting: OTOLARYNGOLOGY
Payer: COMMERCIAL

## 2025-04-07 ENCOUNTER — ANESTHESIA EVENT (OUTPATIENT)
Dept: OPERATING ROOM | Age: 65
End: 2025-04-07
Payer: COMMERCIAL

## 2025-04-07 ENCOUNTER — ANESTHESIA (OUTPATIENT)
Dept: OPERATING ROOM | Age: 65
End: 2025-04-07
Payer: COMMERCIAL

## 2025-04-07 VITALS
SYSTOLIC BLOOD PRESSURE: 139 MMHG | TEMPERATURE: 97.3 F | WEIGHT: 190 LBS | DIASTOLIC BLOOD PRESSURE: 85 MMHG | HEIGHT: 72 IN | HEART RATE: 62 BPM | OXYGEN SATURATION: 96 % | RESPIRATION RATE: 16 BRPM | BODY MASS INDEX: 25.73 KG/M2

## 2025-04-07 DIAGNOSIS — J32.0 CHRONIC MAXILLARY SINUSITIS: ICD-10-CM

## 2025-04-07 DIAGNOSIS — G89.18 POST-OP PAIN: Primary | ICD-10-CM

## 2025-04-07 DIAGNOSIS — J34.2 NASAL SEPTAL DEVIATION: ICD-10-CM

## 2025-04-07 PROCEDURE — 3700000001 HC ADD 15 MINUTES (ANESTHESIA): Performed by: OTOLARYNGOLOGY

## 2025-04-07 PROCEDURE — 6370000000 HC RX 637 (ALT 250 FOR IP): Performed by: STUDENT IN AN ORGANIZED HEALTH CARE EDUCATION/TRAINING PROGRAM

## 2025-04-07 PROCEDURE — 31295 NSL/SINS NDSC SURG MAX SINS: CPT | Performed by: OTOLARYNGOLOGY

## 2025-04-07 PROCEDURE — 87205 SMEAR GRAM STAIN: CPT

## 2025-04-07 PROCEDURE — C2625 STENT, NON-COR, TEM W/DEL SY: HCPCS | Performed by: OTOLARYNGOLOGY

## 2025-04-07 PROCEDURE — 30520 REPAIR OF NASAL SEPTUM: CPT | Performed by: OTOLARYNGOLOGY

## 2025-04-07 PROCEDURE — 88304 TISSUE EXAM BY PATHOLOGIST: CPT

## 2025-04-07 PROCEDURE — 2500000003 HC RX 250 WO HCPCS: Performed by: NURSE ANESTHETIST, CERTIFIED REGISTERED

## 2025-04-07 PROCEDURE — 2580000003 HC RX 258

## 2025-04-07 PROCEDURE — 3600000013 HC SURGERY LEVEL 3 ADDTL 15MIN: Performed by: OTOLARYNGOLOGY

## 2025-04-07 PROCEDURE — 6360000002 HC RX W HCPCS: Performed by: STUDENT IN AN ORGANIZED HEALTH CARE EDUCATION/TRAINING PROGRAM

## 2025-04-07 PROCEDURE — 31254 NSL/SINS NDSC W/PRTL ETHMDCT: CPT | Performed by: OTOLARYNGOLOGY

## 2025-04-07 PROCEDURE — 88331 PATH CONSLTJ SURG 1 BLK 1SPC: CPT

## 2025-04-07 PROCEDURE — 87070 CULTURE OTHR SPECIMN AEROBIC: CPT

## 2025-04-07 PROCEDURE — 61782 SCAN PROC CRANIAL EXTRA: CPT | Performed by: OTOLARYNGOLOGY

## 2025-04-07 PROCEDURE — 7100000010 HC PHASE II RECOVERY - FIRST 15 MIN: Performed by: OTOLARYNGOLOGY

## 2025-04-07 PROCEDURE — 3700000000 HC ANESTHESIA ATTENDED CARE: Performed by: OTOLARYNGOLOGY

## 2025-04-07 PROCEDURE — 3600000003 HC SURGERY LEVEL 3 BASE: Performed by: OTOLARYNGOLOGY

## 2025-04-07 PROCEDURE — 7100000001 HC PACU RECOVERY - ADDTL 15 MIN: Performed by: OTOLARYNGOLOGY

## 2025-04-07 PROCEDURE — C1726 CATH, BAL DIL, NON-VASCULAR: HCPCS | Performed by: OTOLARYNGOLOGY

## 2025-04-07 PROCEDURE — 30802 ABLATE INF TURBINATE SUBMUC: CPT | Performed by: OTOLARYNGOLOGY

## 2025-04-07 PROCEDURE — 87077 CULTURE AEROBIC IDENTIFY: CPT

## 2025-04-07 PROCEDURE — 7100000011 HC PHASE II RECOVERY - ADDTL 15 MIN: Performed by: OTOLARYNGOLOGY

## 2025-04-07 PROCEDURE — 87075 CULTR BACTERIA EXCEPT BLOOD: CPT

## 2025-04-07 PROCEDURE — 31298 NSL/SINS NDSC SURG FRNT&SPHN: CPT | Performed by: OTOLARYNGOLOGY

## 2025-04-07 PROCEDURE — 2720000010 HC SURG SUPPLY STERILE: Performed by: OTOLARYNGOLOGY

## 2025-04-07 PROCEDURE — 2709999900 HC NON-CHARGEABLE SUPPLY: Performed by: OTOLARYNGOLOGY

## 2025-04-07 PROCEDURE — 6370000000 HC RX 637 (ALT 250 FOR IP)

## 2025-04-07 PROCEDURE — 6360000002 HC RX W HCPCS: Performed by: NURSE ANESTHETIST, CERTIFIED REGISTERED

## 2025-04-07 PROCEDURE — C9046 COCAINE HCL NASAL SOLUTION: HCPCS | Performed by: OTOLARYNGOLOGY

## 2025-04-07 PROCEDURE — 6370000000 HC RX 637 (ALT 250 FOR IP): Performed by: OTOLARYNGOLOGY

## 2025-04-07 PROCEDURE — 31296 NSL/SINS NDSC SURG FRNT SINS: CPT | Performed by: OTOLARYNGOLOGY

## 2025-04-07 PROCEDURE — 7100000000 HC PACU RECOVERY - FIRST 15 MIN: Performed by: OTOLARYNGOLOGY

## 2025-04-07 PROCEDURE — 6360000002 HC RX W HCPCS: Performed by: OTOLARYNGOLOGY

## 2025-04-07 DEVICE — PROPEL CONTOUR SINUS IMPLANT
Type: IMPLANTABLE DEVICE | Site: NOSE | Status: FUNCTIONAL
Brand: PROPEL CONTOUR

## 2025-04-07 DEVICE — IMPLANTABLE DEVICE: Type: IMPLANTABLE DEVICE | Site: NOSE | Status: FUNCTIONAL

## 2025-04-07 RX ORDER — OXYCODONE HYDROCHLORIDE 5 MG/1
5 TABLET ORAL
Status: DISCONTINUED | OUTPATIENT
Start: 2025-04-07 | End: 2025-04-07 | Stop reason: HOSPADM

## 2025-04-07 RX ORDER — ONDANSETRON 4 MG/1
4 TABLET, ORALLY DISINTEGRATING ORAL 3 TIMES DAILY PRN
Qty: 6 TABLET | Refills: 0 | Status: SHIPPED | OUTPATIENT
Start: 2025-04-07 | End: 2025-04-09

## 2025-04-07 RX ORDER — ONDANSETRON 2 MG/ML
INJECTION INTRAMUSCULAR; INTRAVENOUS
Status: DISCONTINUED | OUTPATIENT
Start: 2025-04-07 | End: 2025-04-07 | Stop reason: SDUPTHER

## 2025-04-07 RX ORDER — OXYMETAZOLINE HYDROCHLORIDE 0.05 G/100ML
SPRAY NASAL PRN
Status: DISCONTINUED | OUTPATIENT
Start: 2025-04-07 | End: 2025-04-07 | Stop reason: ALTCHOICE

## 2025-04-07 RX ORDER — PROCHLORPERAZINE EDISYLATE 5 MG/ML
5 INJECTION INTRAMUSCULAR; INTRAVENOUS
Status: DISCONTINUED | OUTPATIENT
Start: 2025-04-07 | End: 2025-04-07 | Stop reason: HOSPADM

## 2025-04-07 RX ORDER — MIDAZOLAM HYDROCHLORIDE 1 MG/ML
INJECTION, SOLUTION INTRAMUSCULAR; INTRAVENOUS
Status: DISCONTINUED | OUTPATIENT
Start: 2025-04-07 | End: 2025-04-07 | Stop reason: SDUPTHER

## 2025-04-07 RX ORDER — HYDROCODONE BITARTRATE AND ACETAMINOPHEN 5; 325 MG/1; MG/1
1 TABLET ORAL
Status: COMPLETED | OUTPATIENT
Start: 2025-04-07 | End: 2025-04-07

## 2025-04-07 RX ORDER — SODIUM CHLORIDE 0.9 % (FLUSH) 0.9 %
5-40 SYRINGE (ML) INJECTION EVERY 12 HOURS SCHEDULED
Status: DISCONTINUED | OUTPATIENT
Start: 2025-04-07 | End: 2025-04-07 | Stop reason: HOSPADM

## 2025-04-07 RX ORDER — HYDROCODONE BITARTRATE AND ACETAMINOPHEN 5; 325 MG/1; MG/1
1 TABLET ORAL EVERY 4 HOURS PRN
Qty: 12 TABLET | Refills: 0 | Status: SHIPPED | OUTPATIENT
Start: 2025-04-07 | End: 2025-04-09

## 2025-04-07 RX ORDER — SODIUM CHLORIDE 0.9 % (FLUSH) 0.9 %
5-40 SYRINGE (ML) INJECTION PRN
Status: DISCONTINUED | OUTPATIENT
Start: 2025-04-07 | End: 2025-04-07 | Stop reason: HOSPADM

## 2025-04-07 RX ORDER — COCAINE HYDROCHLORIDE 40 MG/ML
SOLUTION NASAL PRN
Status: DISCONTINUED | OUTPATIENT
Start: 2025-04-07 | End: 2025-04-07 | Stop reason: ALTCHOICE

## 2025-04-07 RX ORDER — SODIUM CHLORIDE, SODIUM LACTATE, POTASSIUM CHLORIDE, CALCIUM CHLORIDE 600; 310; 30; 20 MG/100ML; MG/100ML; MG/100ML; MG/100ML
INJECTION, SOLUTION INTRAVENOUS CONTINUOUS
Status: DISCONTINUED | OUTPATIENT
Start: 2025-04-07 | End: 2025-04-07 | Stop reason: HOSPADM

## 2025-04-07 RX ORDER — LIDOCAINE HYDROCHLORIDE AND EPINEPHRINE 10; 10 MG/ML; UG/ML
INJECTION, SOLUTION INFILTRATION; PERINEURAL PRN
Status: DISCONTINUED | OUTPATIENT
Start: 2025-04-07 | End: 2025-04-07 | Stop reason: ALTCHOICE

## 2025-04-07 RX ORDER — SODIUM CHLORIDE 9 MG/ML
INJECTION, SOLUTION INTRAVENOUS PRN
Status: DISCONTINUED | OUTPATIENT
Start: 2025-04-07 | End: 2025-04-07 | Stop reason: HOSPADM

## 2025-04-07 RX ORDER — OXYMETAZOLINE HYDROCHLORIDE 0.05 G/100ML
2 SPRAY NASAL
Status: COMPLETED | OUTPATIENT
Start: 2025-04-07 | End: 2025-04-07

## 2025-04-07 RX ORDER — FENTANYL CITRATE 50 UG/ML
INJECTION, SOLUTION INTRAMUSCULAR; INTRAVENOUS
Status: DISCONTINUED | OUTPATIENT
Start: 2025-04-07 | End: 2025-04-07 | Stop reason: SDUPTHER

## 2025-04-07 RX ORDER — DEXAMETHASONE SODIUM PHOSPHATE 4 MG/ML
INJECTION, SOLUTION INTRA-ARTICULAR; INTRALESIONAL; INTRAMUSCULAR; INTRAVENOUS; SOFT TISSUE
Status: DISCONTINUED | OUTPATIENT
Start: 2025-04-07 | End: 2025-04-07 | Stop reason: SDUPTHER

## 2025-04-07 RX ORDER — METOPROLOL SUCCINATE 100 MG/1
100 TABLET, EXTENDED RELEASE ORAL EVERY 12 HOURS
Qty: 180 TABLET | Refills: 3 | Status: SHIPPED | OUTPATIENT
Start: 2025-04-07

## 2025-04-07 RX ORDER — NALOXONE HYDROCHLORIDE 0.4 MG/ML
INJECTION, SOLUTION INTRAMUSCULAR; INTRAVENOUS; SUBCUTANEOUS PRN
Status: DISCONTINUED | OUTPATIENT
Start: 2025-04-07 | End: 2025-04-07 | Stop reason: HOSPADM

## 2025-04-07 RX ORDER — PROPOFOL 10 MG/ML
INJECTION, EMULSION INTRAVENOUS
Status: DISCONTINUED | OUTPATIENT
Start: 2025-04-07 | End: 2025-04-07 | Stop reason: SDUPTHER

## 2025-04-07 RX ORDER — FENTANYL CITRATE 50 UG/ML
50 INJECTION, SOLUTION INTRAMUSCULAR; INTRAVENOUS EVERY 5 MIN PRN
Status: DISCONTINUED | OUTPATIENT
Start: 2025-04-07 | End: 2025-04-07 | Stop reason: HOSPADM

## 2025-04-07 RX ORDER — ROCURONIUM BROMIDE 10 MG/ML
INJECTION, SOLUTION INTRAVENOUS
Status: DISCONTINUED | OUTPATIENT
Start: 2025-04-07 | End: 2025-04-07 | Stop reason: SDUPTHER

## 2025-04-07 RX ADMIN — PHENYLEPHRINE HYDROCHLORIDE 100 MCG: 10 INJECTION INTRAVENOUS at 09:19

## 2025-04-07 RX ADMIN — HYDROMORPHONE HYDROCHLORIDE 0.5 MG: 1 INJECTION, SOLUTION INTRAMUSCULAR; INTRAVENOUS; SUBCUTANEOUS at 10:49

## 2025-04-07 RX ADMIN — PHENYLEPHRINE HYDROCHLORIDE 100 MCG: 10 INJECTION INTRAVENOUS at 09:21

## 2025-04-07 RX ADMIN — FENTANYL CITRATE 100 MCG: 50 INJECTION, SOLUTION INTRAMUSCULAR; INTRAVENOUS at 08:56

## 2025-04-07 RX ADMIN — MIDAZOLAM 2 MG: 1 INJECTION INTRAMUSCULAR; INTRAVENOUS at 08:56

## 2025-04-07 RX ADMIN — ROCURONIUM BROMIDE 50 MG: 50 INJECTION INTRAVENOUS at 08:56

## 2025-04-07 RX ADMIN — SODIUM CHLORIDE: 9 INJECTION, SOLUTION INTRAVENOUS at 08:43

## 2025-04-07 RX ADMIN — DEXAMETHASONE SODIUM PHOSPHATE 10 MG: 4 INJECTION, SOLUTION INTRAMUSCULAR; INTRAVENOUS at 09:04

## 2025-04-07 RX ADMIN — ONDANSETRON 4 MG: 2 INJECTION, SOLUTION INTRAMUSCULAR; INTRAVENOUS at 10:03

## 2025-04-07 RX ADMIN — SUGAMMADEX 200 MG: 100 INJECTION, SOLUTION INTRAVENOUS at 10:03

## 2025-04-07 RX ADMIN — HYDROCODONE BITARTRATE AND ACETAMINOPHEN 1 TABLET: 5; 325 TABLET ORAL at 12:25

## 2025-04-07 RX ADMIN — OXYMETAZOLINE HYDROCHLORIDE 2 SPRAY: 0.5 SPRAY NASAL at 07:50

## 2025-04-07 RX ADMIN — PHENYLEPHRINE HYDROCHLORIDE 100 MCG: 10 INJECTION INTRAVENOUS at 09:58

## 2025-04-07 RX ADMIN — PROPOFOL 200 MG: 10 INJECTION, EMULSION INTRAVENOUS at 08:56

## 2025-04-07 RX ADMIN — HYDROMORPHONE HYDROCHLORIDE 0.5 MG: 1 INJECTION, SOLUTION INTRAMUSCULAR; INTRAVENOUS; SUBCUTANEOUS at 11:10

## 2025-04-07 ASSESSMENT — PAIN DESCRIPTION - FREQUENCY
FREQUENCY: CONTINUOUS
FREQUENCY: CONTINUOUS

## 2025-04-07 ASSESSMENT — PAIN - FUNCTIONAL ASSESSMENT
PAIN_FUNCTIONAL_ASSESSMENT: ACTIVITIES ARE NOT PREVENTED
PAIN_FUNCTIONAL_ASSESSMENT: ACTIVITIES ARE NOT PREVENTED
PAIN_FUNCTIONAL_ASSESSMENT: WONG-BAKER FACES
PAIN_FUNCTIONAL_ASSESSMENT: 0-10

## 2025-04-07 ASSESSMENT — PAIN DESCRIPTION - DESCRIPTORS
DESCRIPTORS: ACHING;DISCOMFORT
DESCRIPTORS: SORE
DESCRIPTORS: SORE

## 2025-04-07 ASSESSMENT — PAIN SCALES - GENERAL
PAINLEVEL_OUTOF10: 7
PAINLEVEL_OUTOF10: 7
PAINLEVEL_OUTOF10: 10
PAINLEVEL_OUTOF10: 10

## 2025-04-07 ASSESSMENT — PAIN DESCRIPTION - ONSET
ONSET: ON-GOING
ONSET: ON-GOING

## 2025-04-07 ASSESSMENT — COPD QUESTIONNAIRES: CAT_SEVERITY: MODERATE

## 2025-04-07 ASSESSMENT — PAIN DESCRIPTION - DIRECTION
RADIATING_TOWARDS: MIGRAINE
RADIATING_TOWARDS: MIGRAINE

## 2025-04-07 ASSESSMENT — PAIN DESCRIPTION - PAIN TYPE
TYPE: ACUTE PAIN;SURGICAL PAIN
TYPE: ACUTE PAIN;SURGICAL PAIN

## 2025-04-07 ASSESSMENT — LIFESTYLE VARIABLES: SMOKING_STATUS: 1

## 2025-04-07 ASSESSMENT — PAIN DESCRIPTION - LOCATION
LOCATION: HEAD
LOCATION: HEAD
LOCATION: NOSE
LOCATION: NOSE

## 2025-04-07 ASSESSMENT — PAIN DESCRIPTION - ORIENTATION
ORIENTATION: INNER
ORIENTATION: INNER

## 2025-04-07 NOTE — OP NOTE
Operative Note      Patient: Chandu Montanez  YOB: 1960  MRN: [id number]    Date of Procedure: 4/7/2025    Pre-Op Diagnosis Codes:      * Chronic maxillary sinusitis [J32.0]     * Nasal septal deviation [J34.2]    Post-Op Diagnosis: Same       Procedure(s):  FUNCTIONAL ENDOSCOPIC SINUS SURGERY WITH NAVIGATION SEPTOPLASTY  SUBMUCOSAL RESECTION INFERIOR TURBINATES WITH POLYPECTOMY    Surgeon(s):  Alfredo Moss DO    Assistant:   Resident: Oc Solis DO    Anesthesia: General    Estimated Blood Loss (mL): Minimal    Complications: None    Specimens:   ID Type Source Tests Collected by Time Destination   1 : RIGHT MAXILLARY Tissue Tissue CULTURE, ANAEROBIC, CULTURE, FUNGUS, GRAM STAIN, CULTURE, SURGICAL, CULTURE WITH SMEAR, ACID FAST BACILLIUS Alfredo Moss,  4/7/2025 0925    A : RIGHT NASAL MASS Tissue Tissue SURGICAL PATHOLOGY Alfredo Moss, DO 4/7/2025 0917    B : RIGHT NASAL MASS Tissue Tissue SURGICAL PATHOLOGY Alfredo Moss, DO 4/7/2025 0925        Implants:  Implant Name Type Inv. Item Serial No.  Lot No. LRB No. Used Action   IMPLANT NSL MINI PROPEL - [id number]  IMPLANT NSL MINI PROPEL  [employer] [id number] Right 1 Implanted   IMPLANT NSL MINI PROPEL - [id number]  IMPLANT NSL MINI PROPEL  [employer] [id number] Left 1 Implanted   IMPL SINUS RELEASE PROPEL CONTOUR - [id number] Face/Chin/Dental/Voice IMPL SINUS RELEASE PROPEL CONTOUR  [employer] [id number] Right 1 Implanted         Drains: * No LDAs found *    Findings: right nasal cavity mass, enlarged inferior turbinates, inferior turbinate hypertrophy    Detailed Description of Procedure:     Indications: Chandu Montanez is a 64 y.o. male who presents for sinus surgery due to chronic sinus illness with failure of medical management, complete nasal obstruction, radiographic evidence of chronic sinusitis    Prep  The patient was consented preoperatively and taken back to the  tissue of the anterior ethmoids. This was pretty significant through the anterior ethmoid into the posterior ethmoid through the basal lamella. Clear tissue was not seen through the posterior ethmoids and into the sphenoid itself.     Sphenoid  RIGHT:  Once the patient was properly set up, the pledgets were taken out of the nose and a 0-degree endoscope was placed in the patient's right nasal cavity. Moving from a posterior to anterior position, the first sinus that was addressed was the sphenoid sinus.  The 0-degree endoscope was placed back to the area of the supreme turbinate along the posterior nasopharyngeal wall approximately 1 cm above the nasal choanae on the left side was seen the beginning of the sphenoid sinus. This was somewhat atretic and the sinus was maybe 1 to 2 mm at best. Using the [employer] navigation sphenoid guide, the guide was placed along the same path as the endoscope and placed right up to the area where the sphenoid sinus could be seen. The guidewire was then placed into the sphenoid sinus with manipulation. Once it was in the sphenoid sinus, a 6-mm balloon was then allowed to pass over the area of the sphenoid sinus until the balloon straddled both sides of the sinus ostium. The balloon was then taken up to 12 mmHg pressure with water. This was allowed to sit for 3 seconds and was then taken down. The resulting ostium after balloon dilation of the sphenoid sinus was very large and allowed for visualization of the sphenoid sinus itself.  This area was irrigated out with 180 ml of Saline.  Then suction to remove residual irrigation.  The endoscope and balloon were then removed    Frontals  RIGHT:  Attention was then turned to the frontals moving to the right side. Using the [place], the patient's middle turbinate was medialized until the ethmoid and uncinate process were in full view.  The navigation guidewire was used and I was visually able to pass the guidewire into the patient's frontal

## 2025-04-07 NOTE — ANESTHESIA PRE PROCEDURE
continuity equation is 1.9 cm2.  ·  Tricuspid Valve: Trace regurgitation. The estimated RVSP is 20 mmHg.  ·  Pulmonic Valve: Trace regurgitation.  ·  Image quality is adequate.  ·  Image quality is adequate.       Neuro/Psych:   (+) CVA: no interval change, headaches:, psychiatric history: stable with treatment            GI/Hepatic/Renal: Neg GI/Hepatic/Renal ROS            Endo/Other:    (+) blood dyscrasia: anticoagulation therapy:..                 Abdominal:             Vascular: negative vascular ROS.         Other Findings:             Anesthesia Plan      general     ASA 3       Induction: intravenous.    MIPS: Postoperative opioids intended and Prophylactic antiemetics administered.  Anesthetic plan and risks discussed with patient.      Plan discussed with CRNA.                    Shanon Dewey DO   4/7/2025

## 2025-04-07 NOTE — DISCHARGE INSTRUCTIONS
Nasal Procedures Post-op Instructions  EL Moss M.D.  [phone]           The following are general instructions. There may be some variations in what you are told. If you have any questions, please ask your doctor.     Following sinus surgery the nasal and sinus linings may take up to 3 months to return to normal. Because of this, you may experience nasal congestion, headaches or other nasal symptoms until the surgical site has healed.     Do not blow your nose, bend forward, lift heavy objects or strain yourself following surgery.     Expect some drainage from the front of your nose. Wear the nasal sling until you are no longer having drainage.      You may or may not have nasal packing. Nasal packing will dissolve.     Nasal/sinus surgery tends to be uncomfortable. Take your pain medication and antibiotic as prescribed.     Follow up in the office as scheduled.     If you have any questions, a physician is available 24 hours a day, please call the office.             If any problems occur or if you have any further questions, please call your doctor as soon as possible. If you find that you cannot reach your doctor but feel that your condition needs a doctor’s attention go to an emergency room.     I have received a copy and understand these instructions:        ____________________________________________________________________  (signature patient/responsible adult )

## 2025-04-07 NOTE — ANESTHESIA POSTPROCEDURE EVALUATION
Department of Anesthesiology  Postprocedure Note    Patient: Chandu Montanez  MRN: [id number]  YOB: 1960  Date of evaluation: 4/7/2025    Procedure Summary       Date: 04/07/25 Room / Location: [address]    Anesthesia Start: 0849 Anesthesia Stop: 1025    Procedure: FUNCTIONAL ENDOSCOPIC SINUS SURGERY WITH NAVIGATION SEPTOPLASTY  SUBMUCOSAL RESECTION INFERIOR TURBINATES WITH POLYPECTOMY (Bilateral: Nose) Diagnosis:       Chronic maxillary sinusitis      Nasal septal deviation      (Chronic maxillary sinusitis [J32.0])      (Nasal septal deviation [J34.2])    Surgeons: Alfredo Moss DO Responsible Provider: Shanon Dewey DO    Anesthesia Type: general ASA Status: 3            Anesthesia Type: No value filed.    Larry Phase I: Larry Score: 10    Larry Phase II: Larry Score: 10    Anesthesia Post Evaluation    Patient location during evaluation: PACU  Patient participation: complete - patient participated  Level of consciousness: awake and alert  Nausea & Vomiting: no vomiting and no nausea  Cardiovascular status: hemodynamically stable  Respiratory status: acceptable and spontaneous ventilation  Hydration status: stable  Pain management: adequate    No notable events documented.

## 2025-04-07 NOTE — H&P
Chandu Montanez was seen and re-examined preoperatively today, April 7, 2025.  There was no substantial change in his physical and medical status. All Meds and Family/Social/Previous history was reviewed and there were no significant changes. Patient is fit for the proposed surgical procedure.  All questions were appropriately addressed and had no further questions regarding the risks, benefits, and alternatives of the procedure.  Chandu Montanez and family wished to proceed.    Vitals  Height 1.829 m (6'), weight 86.2 kg (190 lb).    Oc Solis DO  Resident Physician  [facility]  Otolaryngology Residency  4/7/2025  7:21 AM

## 2025-04-07 NOTE — PROGRESS NOTES
Ok for patient to take home migraine medicine per Dr. Dewey.     1150: Ok to resume plavix in 24 hours per Dr. Moss.    1240: Patient still with complaints of 10/10 pain, however prefers to go home.

## 2025-04-09 ENCOUNTER — TELEPHONE (OUTPATIENT)
Dept: NON INVASIVE DIAGNOSTICS | Age: 65
End: 2025-04-09

## 2025-04-09 LAB
MICROORGANISM SPEC CULT: NORMAL
SERVICE CMNT-IMP: NORMAL
SPECIMEN DESCRIPTION: NORMAL

## 2025-04-09 RX ORDER — METOPROLOL SUCCINATE 100 MG/1
100 TABLET, EXTENDED RELEASE ORAL EVERY 12 HOURS
Qty: 180 TABLET | Refills: 3 | Status: CANCELLED | OUTPATIENT
Start: 2025-04-09

## 2025-04-09 NOTE — TELEPHONE ENCOUNTER
Left message in regards to refill we received from [facility].

## 2025-04-10 LAB
MICROORGANISM SPEC CULT: ABNORMAL
MICROORGANISM/AGENT SPEC: ABNORMAL
SERVICE CMNT-IMP: ABNORMAL
SPECIMEN DESCRIPTION: ABNORMAL

## 2025-04-11 LAB — SURGICAL PATHOLOGY REPORT: NORMAL

## 2025-04-14 ENCOUNTER — OFFICE VISIT (OUTPATIENT)
Dept: ENT CLINIC | Age: 65
End: 2025-04-14

## 2025-04-14 VITALS
TEMPERATURE: 98.2 F | WEIGHT: 174.8 LBS | SYSTOLIC BLOOD PRESSURE: 110 MMHG | DIASTOLIC BLOOD PRESSURE: 74 MMHG | HEART RATE: 81 BPM | OXYGEN SATURATION: 95 % | HEIGHT: 72 IN | BODY MASS INDEX: 23.68 KG/M2

## 2025-04-14 DIAGNOSIS — J34.2 DEVIATED SEPTUM: ICD-10-CM

## 2025-04-14 DIAGNOSIS — J34.2 NASAL SEPTAL DEVIATION: Primary | ICD-10-CM

## 2025-04-14 PROCEDURE — 99024 POSTOP FOLLOW-UP VISIT: CPT | Performed by: OTOLARYNGOLOGY

## 2025-04-14 NOTE — PROGRESS NOTES
[facility]  Dr. Alfredo Moss D.O. Ms.Ed.  New Consult       Patient Name:  Chandu Montanez  :  1960     CHIEF C/O:    Chief Complaint   Patient presents with    Post-Op Check     POST OP - 1 wk post op SEPTO/SMIRT/FESS (Sx 25 90 day GLOBAL        HISTORY OBTAINED FROM:      HISTORY OF PRESENT ILLNESS:       History of Present Illness               Septo smirt fess Sx 25   Using nasal saline. Denied fever chills   Still having pain     Past Medical History:   Diagnosis Date    Cerebral artery occlusion with cerebral infarction (HCC)     2025- no residuals per patient    Chronic obstructive lung disease (HCC) 2021    Chronic systolic (congestive) heart failure 09/10/2021    Degeneration of lumbar intervertebral disc 2020    Deviated septum     Mixed hyperlipidemia 2021    Nasal sinus polyp      Past Surgical History:   Procedure Laterality Date    ABLATION OF DYSRHYTHMIC FOCUS  11/15/2022    PVC  (Dr. La)    SINUS ENDOSCOPY Bilateral 2025    FUNCTIONAL ENDOSCOPIC SINUS SURGERY WITH NAVIGATION SEPTOPLASTY  SUBMUCOSAL RESECTION INFERIOR TURBINATES WITH POLYPECTOMY performed by Alfredo Moss DO at [facility]       Current Outpatient Medications:     amoxicillin-clavulanate (AUGMENTIN) 875-125 MG per tablet, Take 1 tablet by mouth 2 times daily for 7 days, Disp: 14 tablet, Rfl: 0    metoprolol succinate (TOPROL XL) 100 MG extended release tablet, Take 1 tablet by mouth in the morning and 1 tablet in the evening., Disp: 180 tablet, Rfl: 3    Umeclidinium-Vilanterol (ANORO ELLIPTA IN), Inhale 1 puff into the lungs daily, Disp: , Rfl:     QULIPTA 60 MG TABS, Take 1 tablet by mouth daily, Disp: , Rfl:     lisinopril (PRINIVIL;ZESTRIL) 2.5 MG tablet, Take 1 tablet by mouth daily, Disp: , Rfl:     clopidogrel (PLAVIX) 75 MG tablet, Take 1 tablet by mouth daily, Disp: , Rfl:     fluticasone (FLONASE) 50 MCG/ACT nasal spray, 2 sprays by Each Nostril route daily,

## 2025-04-16 RX ORDER — METOPROLOL SUCCINATE 100 MG/1
100 TABLET, EXTENDED RELEASE ORAL EVERY 12 HOURS
Qty: 180 TABLET | Refills: 3 | Status: SHIPPED | OUTPATIENT
Start: 2025-04-16

## 2025-04-25 RX ORDER — METOPROLOL SUCCINATE 50 MG/1
50 TABLET, EXTENDED RELEASE ORAL DAILY
Qty: 90 TABLET | Refills: 3 | Status: SHIPPED | OUTPATIENT
Start: 2025-04-25

## 2025-05-03 PROBLEM — Z01.810 PREOPERATIVE CARDIOVASCULAR EXAMINATION: Status: RESOLVED | Noted: 2025-02-07 | Resolved: 2025-05-03

## 2025-05-07 ENCOUNTER — HOSPITAL ENCOUNTER (OUTPATIENT)
Age: 65
Setting detail: OUTPATIENT SURGERY
Discharge: HOME | End: 2025-05-07
Attending: STUDENT IN AN ORGANIZED HEALTH CARE EDUCATION/TRAINING PROGRAM | Admitting: STUDENT IN AN ORGANIZED HEALTH CARE EDUCATION/TRAINING PROGRAM
Payer: COMMERCIAL

## 2025-05-07 VITALS
OXYGEN SATURATION: 97 % | TEMPERATURE: 97.3 F | RESPIRATION RATE: 16 BRPM | HEIGHT: 72 IN | HEART RATE: 64 BPM | WEIGHT: 190 LBS | DIASTOLIC BLOOD PRESSURE: 79 MMHG | SYSTOLIC BLOOD PRESSURE: 132 MMHG | BODY MASS INDEX: 25.73 KG/M2

## 2025-05-07 DIAGNOSIS — I63.9 IMPENDING CEREBROVASCULAR ACCIDENT (HCC): ICD-10-CM

## 2025-05-07 LAB — ECHO BSA: 2.09 M2

## 2025-05-07 PROCEDURE — 33285 INSJ SUBQ CAR RHYTHM MNTR: CPT | Performed by: STUDENT IN AN ORGANIZED HEALTH CARE EDUCATION/TRAINING PROGRAM

## 2025-05-07 PROCEDURE — 7100000010 HC PHASE II RECOVERY - FIRST 15 MIN: Performed by: STUDENT IN AN ORGANIZED HEALTH CARE EDUCATION/TRAINING PROGRAM

## 2025-05-07 PROCEDURE — 6360000002 HC RX W HCPCS: Performed by: STUDENT IN AN ORGANIZED HEALTH CARE EDUCATION/TRAINING PROGRAM

## 2025-05-07 PROCEDURE — C1764 EVENT RECORDER, CARDIAC: HCPCS | Performed by: STUDENT IN AN ORGANIZED HEALTH CARE EDUCATION/TRAINING PROGRAM

## 2025-05-07 DEVICE — INSERTABLE CARDIAC MONITOR
Type: IMPLANTABLE DEVICE | Site: CHEST | Status: FUNCTIONAL
Brand: LUX-DX II+™

## 2025-05-07 RX ORDER — LIDOCAINE HYDROCHLORIDE 10 MG/ML
INJECTION, SOLUTION INFILTRATION; PERINEURAL PRN
Status: DISCONTINUED | OUTPATIENT
Start: 2025-05-07 | End: 2025-05-07 | Stop reason: HOSPADM

## 2025-05-07 NOTE — DISCHARGE INSTRUCTIONS
[facility]    Implantable Cardiac Monitor Discharge Instructions For Patients      Medications: Resume all prescribed medications.    Follow Up:  Device Clinic: You will be seen on Thursday 5/22/25 at 10:30 am at the [facility] for  an incision and device check.  If you need to reschedule this appointment call the office at [phone].  Dr. La Office: You will be contacted to schedule a follow up appointment with Dr. La office in ~3 months. If you are not contacted within 10 business days, please call the office at [phone].    Incision Care:   Brown (Aquacel) bandage: This is a waterproof bandage. Remove in 1 week Wednesday 5/14/25.  Surgical glue: located underneath the brown bandage. Do NOT pick at, scratch or remove the surgical glue from your incision. This will fall off on its own over the next several weeks. It is there to prevent infection.  Bathing: Keep the incision dry. You may shower tomorrow evening, but do NOT spray the water directly at the site or scrub at the site. Pat dry only with a clean towel. No soaking in a bathtub, hot tub, or pool for 6 weeks.    Daily monitoring: Check the area daily. Notify the office at [phone] if you develop any redness, swelling, drainage, warmth, or fever greater than 100 degrees.    Activity: You may continue regular daily activities, but try to prevent any hard blows to the incision site.    Driving: No driving until the day after your procedure.    Special Instructions: Let your dentist, doctor, or medical specialist know that you have an implantable cardiac monitor so precautions can be taken to protect the device. There is no need to take antibiotics prior to dental procedures. Your device is considered to be \"MRI conditional,\" meaning that under certain circumstances, MRI exams may be performed immediately post implantation. Your device may set off a metal detector, such as those used in

## 2025-05-07 NOTE — H&P
Peripheral pulses normal including carotid auscultation, no noted aortic bruit, bilateral femoral and pedal pulses are normal in quality  Lungs: clear to auscultation bilaterally, normal respiratory effort without used of accessory muscles, no wheezes  Abdomen: soft, non-tender, bowel sounds present, no masses or hepatomegaly   Extremities: no digital clubbing, no edema   Skin: warm, no rashes   Neuro/Psych: A&O x 3, normal mood and affect     Assessment/plan:  Cryptogenic CVA   - Event monitor (2/2025): No AF/AFL.  - Recommend cardiac monitor implant.  - Follow-up:  -- Device clinic: 5/22/2025 at 10:30 AM.  -- EP appointment: I will message office to arrange in approximately 3 months.    anterior RVOT PVC sp RFA (11/15/2022-Dr. 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  VE burden = 42%.  He was instructed to avoid EtOH/drugs, as well as compliance with GDMT including beta-blocker.  Repeat monitor reported VE burden of 43%.  In 11/2022, he underwent PVC ablation of anterior RVOT PVC (pre-- QRS 30 ms).  After procedure, he was noted to have recurrence of PVCs.  Event monitor reported VE burden reduced to 19%.  He was reportedly noncompliant with metoprolol around this time.  Metoprolol reinitiated.  Repeat monitor reported VE burden of 4%.  In 8/2024, he complained of symptoms related to PVCs.  In 9/2024, monitor reported VE burden of 19%.  He was referred for redo PVC ablation, but this was canceled due to cryptogenic CVA.  In 2/2025, event monitor reported VE burden of 18.3% (predominant single morphology.  TTE reported LVEF of 50-55%.  At some point, metoprolol XL dose was reduced from 150 mg daily to 50 mg daily for unclear reason.  - Consider redo ablation in the future depending on patient's symptoms/LVEF.  Most recent LVEF assessment with some discordance in values (TTE: 50-55%; cardiac PET: 44%).    3.  NICM  - Historically patient's LVEF is then mildly reduced at

## 2025-05-08 ENCOUNTER — TELEPHONE (OUTPATIENT)
Dept: NON INVASIVE DIAGNOSTICS | Age: 65
End: 2025-05-08

## 2025-05-08 NOTE — TELEPHONE ENCOUNTER
----- Message from Dr. Royce La DO sent at 5/7/2025  1:01 PM EDT -----  Regarding: appt  Please arrange EP appt in ~3 months.    -Royce La DO

## 2025-05-16 ENCOUNTER — TELEPHONE (OUTPATIENT)
Age: 65
End: 2025-05-16

## 2025-05-16 NOTE — TELEPHONE ENCOUNTER
I called Mr. Montanez to see how he's doing since his Linq insertion on 5/7/25.  He states he's doing well. Denies any bleeding, drainage, or swelling from insertion site. Aquacel dsg removed, skin glue intact. He's aware of his follow up appt @ the [facility] Clinic on Thursday 5/22/25 at 10:30 am .  He offers no complaints and is thankful for the call.

## 2025-05-22 ENCOUNTER — CLINICAL SUPPORT (OUTPATIENT)
Dept: NON INVASIVE DIAGNOSTICS | Age: 65
End: 2025-05-22

## 2025-05-22 DIAGNOSIS — I49.9 IRREGULAR HEART BEAT: Primary | ICD-10-CM

## 2025-05-22 NOTE — PROGRESS NOTES
Wound check:   Incision without redness, edema or drainage.  Ecchymosis noted but resolving.       Kaitlin TRANN,RN   [facility] [place]   Device Clinic

## 2025-05-22 NOTE — PATIENT INSTRUCTIONS
You may shower starting now      Call if any signs or symptoms of infection [phone] ext: [phone]  Fevers, chills, redness, swelling or drainage.       Hook up home  Monitor    [facility] support (home monitoring) [phone]

## 2025-06-16 ENCOUNTER — OFFICE VISIT (OUTPATIENT)
Dept: ENT CLINIC | Age: 65
End: 2025-06-16
Payer: COMMERCIAL

## 2025-06-16 VITALS
OXYGEN SATURATION: 100 % | DIASTOLIC BLOOD PRESSURE: 71 MMHG | SYSTOLIC BLOOD PRESSURE: 121 MMHG | WEIGHT: 178 LBS | BODY MASS INDEX: 24.11 KG/M2 | HEART RATE: 73 BPM | HEIGHT: 72 IN | TEMPERATURE: 97.6 F | RESPIRATION RATE: 12 BRPM

## 2025-06-16 DIAGNOSIS — Z98.890 S/P NASAL SEPTOPLASTY: ICD-10-CM

## 2025-06-16 DIAGNOSIS — Z98.890 S/P FESS (FUNCTIONAL ENDOSCOPIC SINUS SURGERY): Primary | ICD-10-CM

## 2025-06-16 PROCEDURE — 99213 OFFICE O/P EST LOW 20 MIN: CPT | Performed by: OTOLARYNGOLOGY

## 2025-06-16 PROCEDURE — 1123F ACP DISCUSS/DSCN MKR DOCD: CPT | Performed by: OTOLARYNGOLOGY

## 2025-06-16 RX ORDER — FLUTICASONE PROPIONATE 50 MCG
2 SPRAY, SUSPENSION (ML) NASAL DAILY
Qty: 3 EACH | Refills: 1 | Status: SHIPPED | OUTPATIENT
Start: 2025-06-16

## 2025-06-16 ASSESSMENT — ENCOUNTER SYMPTOMS
COLOR CHANGE: 0
VOMITING: 0
RHINORRHEA: 0
SHORTNESS OF BREATH: 0
NAUSEA: 0

## 2025-06-16 ASSESSMENT — VISUAL ACUITY: OU: 1

## 2025-06-16 NOTE — PROGRESS NOTES
[facility]  Dr. Alfredo GÓMEZ. OZIEL Moss. Ms.Ed.  New Consult       Patient Name:  Chandu Montanez  :  1960     CHIEF C/O:    Chief Complaint   Patient presents with    Post-Op Check     6 WK POST OP septo smirt fess. Patient states breathing and everything is going very well sinus issue's are much better       HISTORY OBTAINED FROM:      HISTORY OF PRESENT ILLNESS:       History of Present Illness               Here for 6 week follow up Septo smirt fess Sx 25. States doing well, breathing and other symptoms improved. Denied congestion, rhinorrhea, and PND. Denied sinus infections since previous encounter. Using Nasal saline, no medicated nasal sprays at this time.     Past Medical History:   Diagnosis Date    Cerebral artery occlusion with cerebral infarction (HCC)     2025- no residuals per patient    Chronic obstructive lung disease (HCC) 2021    Chronic systolic (congestive) heart failure (HCC) 09/10/2021    Degeneration of lumbar intervertebral disc 2020    Deviated septum     Mixed hyperlipidemia 2021    Nasal sinus polyp      Past Surgical History:   Procedure Laterality Date    ABLATION OF DYSRHYTHMIC FOCUS  11/15/2022    PVC  (Dr. La)    EP DEVICE PROCEDURE N/A 2025    Loop recorder insert performed by Royce La DO at [facility]    SINUS ENDOSCOPY Bilateral 2025    FUNCTIONAL ENDOSCOPIC SINUS SURGERY WITH NAVIGATION SEPTOPLASTY  SUBMUCOSAL RESECTION INFERIOR TURBINATES WITH POLYPECTOMY performed by Alfredo oMss DO at [facility] OR       Current Outpatient Medications:     fluticasone (FLONASE) 50 MCG/ACT nasal spray, 2 sprays by Each Nostril route daily, Disp: 3 each, Rfl: 1    metoprolol succinate (TOPROL XL) 50 MG extended release tablet, Take 1 tablet by mouth daily, Disp: 90 tablet, Rfl: 3    Umeclidinium-Vilanterol (ANORO ELLIPTA IN), Inhale 1 puff into the lungs daily, Disp: , Rfl:     QULIPTA 60 MG TABS, Take 1 tablet by

## 2025-06-21 PROCEDURE — 93298 REM INTERROG DEV EVAL SCRMS: CPT | Performed by: STUDENT IN AN ORGANIZED HEALTH CARE EDUCATION/TRAINING PROGRAM

## 2025-07-22 PROCEDURE — 93298 REM INTERROG DEV EVAL SCRMS: CPT | Performed by: STUDENT IN AN ORGANIZED HEALTH CARE EDUCATION/TRAINING PROGRAM

## 2025-08-21 ENCOUNTER — OFFICE VISIT (OUTPATIENT)
Dept: NON INVASIVE DIAGNOSTICS | Age: 65
End: 2025-08-21
Payer: COMMERCIAL

## 2025-08-21 VITALS
HEART RATE: 52 BPM | SYSTOLIC BLOOD PRESSURE: 114 MMHG | TEMPERATURE: 97.3 F | RESPIRATION RATE: 16 BRPM | DIASTOLIC BLOOD PRESSURE: 70 MMHG | WEIGHT: 178 LBS | BODY MASS INDEX: 24.92 KG/M2 | HEIGHT: 71 IN

## 2025-08-21 DIAGNOSIS — I49.3 FREQUENT PVCS: Primary | ICD-10-CM

## 2025-08-21 DIAGNOSIS — I42.8 NICM (NONISCHEMIC CARDIOMYOPATHY) (HCC): ICD-10-CM

## 2025-08-21 DIAGNOSIS — F17.200 TOBACCO DEPENDENCE SYNDROME: ICD-10-CM

## 2025-08-21 DIAGNOSIS — I49.9 IRREGULAR HEART BEAT: ICD-10-CM

## 2025-08-21 DIAGNOSIS — I63.9 CRYPTOGENIC STROKE (HCC): ICD-10-CM

## 2025-08-21 DIAGNOSIS — Z95.818 IMPLANTABLE LOOP RECORDER PRESENT: ICD-10-CM

## 2025-08-21 PROCEDURE — 1123F ACP DISCUSS/DSCN MKR DOCD: CPT | Performed by: NURSE PRACTITIONER

## 2025-08-21 PROCEDURE — 93000 ELECTROCARDIOGRAM COMPLETE: CPT | Performed by: STUDENT IN AN ORGANIZED HEALTH CARE EDUCATION/TRAINING PROGRAM

## 2025-08-21 PROCEDURE — 99215 OFFICE O/P EST HI 40 MIN: CPT | Performed by: NURSE PRACTITIONER

## 2025-08-22 PROCEDURE — 93298 REM INTERROG DEV EVAL SCRMS: CPT | Performed by: STUDENT IN AN ORGANIZED HEALTH CARE EDUCATION/TRAINING PROGRAM

## (undated) DEVICE — MARKER,SKIN,WI/RULER AND LABELS: Brand: MEDLINE

## (undated) DEVICE — KIT OPHTHLMC W/7.3CM X 7.3CM INSTRMNT WIPE 0.4CM X 15CM EYE

## (undated) DEVICE — YANKAUER,OPEN TIP,W/O VENT,STERILE: Brand: MEDLINE INDUSTRIES, INC.

## (undated) DEVICE — SPONGE GZ W4XL4IN RAYON POLY CVR W/NONWOVEN FAB STRL 2/PK

## (undated) DEVICE — SOLUTION IV 500ML 0.9% SOD CHL PH 5 INJ USP VIAFLX PLAS

## (undated) DEVICE — TUBING SUCTION IRRIGATION STERILE SINGLE USE

## (undated) DEVICE — GLOVE ORANGE PI 7 1/2   MSG9075

## (undated) DEVICE — DALE NASAL DRESSING HOLDER, FITS MOST: Brand: DALE NASAL DRESSING HOLDER

## (undated) DEVICE — SPONGE,NEURO,0.5"X3",XR,STRL,LF,10/PK: Brand: MEDLINE

## (undated) DEVICE — DOUBLE BASIN SET: Brand: MEDLINE INDUSTRIES, INC.

## (undated) DEVICE — SWAB SPEC COLL SHFT L5.25IN POLYUR FOAM TIP SFT DBL MEDIA

## (undated) DEVICE — SPLINT 1524055 DOYLE II AIRWAY SET: Brand: DOYLE II ™

## (undated) DEVICE — SYRINGE MED 10ML TRNSLUC BRL PLUNG BLK MRK POLYPR CTRL

## (undated) DEVICE — TRACKER SURG NAVIGATION PT TRUDI

## (undated) DEVICE — SOLUTION IV 1000ML 0.9% SOD CHL PH 5 INJ USP VIAFLX PLAS

## (undated) DEVICE — 4-PORT MANIFOLD: Brand: NEPTUNE 2

## (undated) DEVICE — CABLE NAVIGATION TRUDI

## (undated) DEVICE — MAGNETIC INSTR DRAPE 20X16: Brand: MEDLINE INDUSTRIES, INC.

## (undated) DEVICE — SYSTEM SINUPLASTY BAL L16MM DIA6MM SPINPLUS NAV RELIEVA

## (undated) DEVICE — KIT COLL REG FLOCKED SWAB VIABILITY FLEXIBILITY EASE OF USE

## (undated) DEVICE — TOWEL,OR,DSP,ST,BLUE,STD,6/PK,12PK/CS: Brand: MEDLINE

## (undated) DEVICE — 3M™ TEGADERM™ TRANSPARENT FILM DRESSING FRAME STYLE, 1626W, 4 IN X 4-3/4 IN (10 CM X 12 CM), 50/CT 4CT/CASE: Brand: 3M™ TEGADERM™

## (undated) DEVICE — TUBING, SUCTION, 3/16" X 12', STRAIGHT: Brand: MEDLINE

## (undated) DEVICE — Device

## (undated) DEVICE — PAD,NON-ADHERENT,2X3,STERILE,LF,1/PK: Brand: MEDLINE

## (undated) DEVICE — BLADE SHAVER STRAIGHT 4MM TRUDI DS 0

## (undated) DEVICE — DEVICE INFL BLLN FOR DEFLATION OF CATH ACCLARENT